# Patient Record
Sex: MALE | Race: WHITE | NOT HISPANIC OR LATINO | Employment: FULL TIME | URBAN - METROPOLITAN AREA
[De-identification: names, ages, dates, MRNs, and addresses within clinical notes are randomized per-mention and may not be internally consistent; named-entity substitution may affect disease eponyms.]

---

## 2019-08-10 ENCOUNTER — HOSPITAL ENCOUNTER (INPATIENT)
Facility: CLINIC | Age: 54
LOS: 2 days | Discharge: HOME | DRG: 247 | End: 2019-08-12
Attending: EMERGENCY MEDICINE | Admitting: INTERNAL MEDICINE
Payer: COMMERCIAL

## 2019-08-10 ENCOUNTER — APPOINTMENT (OUTPATIENT)
Dept: CT IMAGING | Facility: CLINIC | Age: 54
DRG: 247 | End: 2019-08-10
Attending: EMERGENCY MEDICINE
Payer: COMMERCIAL

## 2019-08-10 DIAGNOSIS — I21.4 NSTEMI (NON-ST ELEVATED MYOCARDIAL INFARCTION) (H): ICD-10-CM

## 2019-08-10 LAB
ALBUMIN SERPL-MCNC: 4.1 G/DL (ref 3.4–5)
ALP SERPL-CCNC: 101 U/L (ref 40–150)
ALT SERPL W P-5'-P-CCNC: 59 U/L (ref 0–70)
ANION GAP SERPL CALCULATED.3IONS-SCNC: 3 MMOL/L (ref 3–14)
AST SERPL W P-5'-P-CCNC: 29 U/L (ref 0–45)
BASOPHILS # BLD AUTO: 0 10E9/L (ref 0–0.2)
BASOPHILS NFR BLD AUTO: 0.2 %
BILIRUB DIRECT SERPL-MCNC: 0.1 MG/DL (ref 0–0.2)
BILIRUB SERPL-MCNC: 0.5 MG/DL (ref 0.2–1.3)
BUN SERPL-MCNC: 17 MG/DL (ref 7–30)
CALCIUM SERPL-MCNC: 9.1 MG/DL (ref 8.5–10.1)
CHLORIDE SERPL-SCNC: 102 MMOL/L (ref 94–109)
CO2 SERPL-SCNC: 32 MMOL/L (ref 20–32)
CREAT SERPL-MCNC: 1.07 MG/DL (ref 0.66–1.25)
D DIMER PPP FEU-MCNC: 0.7 UG/ML FEU (ref 0–0.5)
DIFFERENTIAL METHOD BLD: NORMAL
EOSINOPHIL # BLD AUTO: 0.2 10E9/L (ref 0–0.7)
EOSINOPHIL NFR BLD AUTO: 1.8 %
ERYTHROCYTE [DISTWIDTH] IN BLOOD BY AUTOMATED COUNT: 12 % (ref 10–15)
ERYTHROCYTE [DISTWIDTH] IN BLOOD BY AUTOMATED COUNT: 12 % (ref 10–15)
GFR SERPL CREATININE-BSD FRML MDRD: 79 ML/MIN/{1.73_M2}
GLUCOSE SERPL-MCNC: 101 MG/DL (ref 70–99)
HCT VFR BLD AUTO: 47 % (ref 40–53)
HCT VFR BLD AUTO: 49.3 % (ref 40–53)
HGB BLD-MCNC: 16.4 G/DL (ref 13.3–17.7)
HGB BLD-MCNC: 16.9 G/DL (ref 13.3–17.7)
IMM GRANULOCYTES # BLD: 0 10E9/L (ref 0–0.4)
IMM GRANULOCYTES NFR BLD: 0.2 %
INTERPRETATION ECG - MUSE: NORMAL
INTERPRETATION ECG - MUSE: NORMAL
LIPASE SERPL-CCNC: 409 U/L (ref 73–393)
LMWH PPP CHRO-ACNC: 0.35 IU/ML
LYMPHOCYTES # BLD AUTO: 3 10E9/L (ref 0.8–5.3)
LYMPHOCYTES NFR BLD AUTO: 33.5 %
MAGNESIUM SERPL-MCNC: 2 MG/DL (ref 1.6–2.3)
MCH RBC QN AUTO: 29.1 PG (ref 26.5–33)
MCH RBC QN AUTO: 29.7 PG (ref 26.5–33)
MCHC RBC AUTO-ENTMCNC: 34.3 G/DL (ref 31.5–36.5)
MCHC RBC AUTO-ENTMCNC: 34.9 G/DL (ref 31.5–36.5)
MCV RBC AUTO: 85 FL (ref 78–100)
MCV RBC AUTO: 85 FL (ref 78–100)
MONOCYTES # BLD AUTO: 0.8 10E9/L (ref 0–1.3)
MONOCYTES NFR BLD AUTO: 9.4 %
NEUTROPHILS # BLD AUTO: 4.9 10E9/L (ref 1.6–8.3)
NEUTROPHILS NFR BLD AUTO: 54.9 %
NRBC # BLD AUTO: 0 10*3/UL
NRBC BLD AUTO-RTO: 0 /100
PLATELET # BLD AUTO: 241 10E9/L (ref 150–450)
PLATELET # BLD AUTO: 260 10E9/L (ref 150–450)
POTASSIUM SERPL-SCNC: 3.7 MMOL/L (ref 3.4–5.3)
PROT SERPL-MCNC: 8.8 G/DL (ref 6.8–8.8)
RBC # BLD AUTO: 5.52 10E12/L (ref 4.4–5.9)
RBC # BLD AUTO: 5.81 10E12/L (ref 4.4–5.9)
SODIUM SERPL-SCNC: 137 MMOL/L (ref 133–144)
TROPONIN I SERPL-MCNC: 0.04 UG/L (ref 0–0.04)
TROPONIN I SERPL-MCNC: 0.62 UG/L (ref 0–0.04)
TROPONIN I SERPL-MCNC: 1.18 UG/L (ref 0–0.04)
TROPONIN I SERPL-MCNC: 1.5 UG/L (ref 0–0.04)
TROPONIN I SERPL-MCNC: 1.55 UG/L (ref 0–0.04)
TROPONIN I SERPL-MCNC: 1.7 UG/L (ref 0–0.04)
TROPONIN I SERPL-MCNC: 1.84 UG/L (ref 0–0.04)
WBC # BLD AUTO: 8.9 10E9/L (ref 4–11)
WBC # BLD AUTO: 9.3 10E9/L (ref 4–11)

## 2019-08-10 PROCEDURE — 84484 ASSAY OF TROPONIN QUANT: CPT | Performed by: INTERNAL MEDICINE

## 2019-08-10 PROCEDURE — 99285 EMERGENCY DEPT VISIT HI MDM: CPT | Mod: 25

## 2019-08-10 PROCEDURE — 93005 ELECTROCARDIOGRAM TRACING: CPT

## 2019-08-10 PROCEDURE — 25800030 ZZH RX IP 258 OP 636: Performed by: INTERNAL MEDICINE

## 2019-08-10 PROCEDURE — 36415 COLL VENOUS BLD VENIPUNCTURE: CPT | Performed by: INTERNAL MEDICINE

## 2019-08-10 PROCEDURE — 12000000 ZZH R&B MED SURG/OB

## 2019-08-10 PROCEDURE — 25000128 H RX IP 250 OP 636: Performed by: EMERGENCY MEDICINE

## 2019-08-10 PROCEDURE — 85520 HEPARIN ASSAY: CPT | Performed by: INTERNAL MEDICINE

## 2019-08-10 PROCEDURE — 83690 ASSAY OF LIPASE: CPT | Performed by: EMERGENCY MEDICINE

## 2019-08-10 PROCEDURE — 96365 THER/PROPH/DIAG IV INF INIT: CPT

## 2019-08-10 PROCEDURE — 99223 1ST HOSP IP/OBS HIGH 75: CPT | Mod: AI | Performed by: INTERNAL MEDICINE

## 2019-08-10 PROCEDURE — 83735 ASSAY OF MAGNESIUM: CPT | Performed by: INTERNAL MEDICINE

## 2019-08-10 PROCEDURE — 93005 ELECTROCARDIOGRAM TRACING: CPT | Mod: 76

## 2019-08-10 PROCEDURE — 71260 CT THORAX DX C+: CPT

## 2019-08-10 PROCEDURE — 80076 HEPATIC FUNCTION PANEL: CPT | Performed by: EMERGENCY MEDICINE

## 2019-08-10 PROCEDURE — 85379 FIBRIN DEGRADATION QUANT: CPT | Performed by: EMERGENCY MEDICINE

## 2019-08-10 PROCEDURE — 85027 COMPLETE CBC AUTOMATED: CPT | Performed by: INTERNAL MEDICINE

## 2019-08-10 PROCEDURE — 99222 1ST HOSP IP/OBS MODERATE 55: CPT | Performed by: INTERNAL MEDICINE

## 2019-08-10 PROCEDURE — 85025 COMPLETE CBC W/AUTO DIFF WBC: CPT | Performed by: EMERGENCY MEDICINE

## 2019-08-10 PROCEDURE — 84484 ASSAY OF TROPONIN QUANT: CPT | Performed by: EMERGENCY MEDICINE

## 2019-08-10 PROCEDURE — 80048 BASIC METABOLIC PNL TOTAL CA: CPT | Performed by: EMERGENCY MEDICINE

## 2019-08-10 PROCEDURE — 25000132 ZZH RX MED GY IP 250 OP 250 PS 637: Performed by: INTERNAL MEDICINE

## 2019-08-10 RX ORDER — POTASSIUM CL/LIDO/0.9 % NACL 10MEQ/0.1L
10 INTRAVENOUS SOLUTION, PIGGYBACK (ML) INTRAVENOUS
Status: DISCONTINUED | OUTPATIENT
Start: 2019-08-10 | End: 2019-08-12 | Stop reason: HOSPADM

## 2019-08-10 RX ORDER — NALOXONE HYDROCHLORIDE 0.4 MG/ML
.1-.4 INJECTION, SOLUTION INTRAMUSCULAR; INTRAVENOUS; SUBCUTANEOUS
Status: DISCONTINUED | OUTPATIENT
Start: 2019-08-10 | End: 2019-08-12 | Stop reason: HOSPADM

## 2019-08-10 RX ORDER — POTASSIUM CHLORIDE 1500 MG/1
20-40 TABLET, EXTENDED RELEASE ORAL
Status: DISCONTINUED | OUTPATIENT
Start: 2019-08-10 | End: 2019-08-12 | Stop reason: HOSPADM

## 2019-08-10 RX ORDER — LIDOCAINE 40 MG/G
CREAM TOPICAL
Status: DISCONTINUED | OUTPATIENT
Start: 2019-08-10 | End: 2019-08-12 | Stop reason: HOSPADM

## 2019-08-10 RX ORDER — ASPIRIN 81 MG/1
162 TABLET, CHEWABLE ORAL DAILY
Status: DISCONTINUED | OUTPATIENT
Start: 2019-08-10 | End: 2019-08-10

## 2019-08-10 RX ORDER — IOPAMIDOL 755 MG/ML
500 INJECTION, SOLUTION INTRAVASCULAR ONCE
Status: COMPLETED | OUTPATIENT
Start: 2019-08-10 | End: 2019-08-10

## 2019-08-10 RX ORDER — ATORVASTATIN CALCIUM 40 MG/1
40 TABLET, FILM COATED ORAL EVERY EVENING
Status: DISCONTINUED | OUTPATIENT
Start: 2019-08-10 | End: 2019-08-11

## 2019-08-10 RX ORDER — POTASSIUM CHLORIDE 7.45 MG/ML
10 INJECTION INTRAVENOUS
Status: DISCONTINUED | OUTPATIENT
Start: 2019-08-10 | End: 2019-08-12 | Stop reason: HOSPADM

## 2019-08-10 RX ORDER — POTASSIUM CHLORIDE 1.5 G/1.58G
20-40 POWDER, FOR SOLUTION ORAL
Status: DISCONTINUED | OUTPATIENT
Start: 2019-08-10 | End: 2019-08-12 | Stop reason: HOSPADM

## 2019-08-10 RX ORDER — MAGNESIUM SULFATE HEPTAHYDRATE 40 MG/ML
4 INJECTION, SOLUTION INTRAVENOUS EVERY 4 HOURS PRN
Status: DISCONTINUED | OUTPATIENT
Start: 2019-08-10 | End: 2019-08-12 | Stop reason: HOSPADM

## 2019-08-10 RX ORDER — ASPIRIN 81 MG/1
81 TABLET, CHEWABLE ORAL DAILY
Status: DISCONTINUED | OUTPATIENT
Start: 2019-08-10 | End: 2019-08-11

## 2019-08-10 RX ORDER — SODIUM CHLORIDE 9 MG/ML
INJECTION, SOLUTION INTRAVENOUS CONTINUOUS
Status: DISCONTINUED | OUTPATIENT
Start: 2019-08-10 | End: 2019-08-11

## 2019-08-10 RX ORDER — POTASSIUM CHLORIDE 29.8 MG/ML
20 INJECTION INTRAVENOUS
Status: DISCONTINUED | OUTPATIENT
Start: 2019-08-10 | End: 2019-08-12 | Stop reason: HOSPADM

## 2019-08-10 RX ADMIN — SODIUM CHLORIDE: 9 INJECTION, SOLUTION INTRAVENOUS at 05:32

## 2019-08-10 RX ADMIN — Medication 12.5 MG: at 08:55

## 2019-08-10 RX ADMIN — IOPAMIDOL 72 ML: 755 INJECTION, SOLUTION INTRAVENOUS at 02:03

## 2019-08-10 RX ADMIN — SODIUM CHLORIDE 85 ML: 9 INJECTION, SOLUTION INTRAVENOUS at 02:03

## 2019-08-10 RX ADMIN — ASPIRIN 81 MG 81 MG: 81 TABLET ORAL at 08:53

## 2019-08-10 RX ADMIN — ASPIRIN 81 MG 162 MG: 81 TABLET ORAL at 08:53

## 2019-08-10 RX ADMIN — SODIUM CHLORIDE: 9 INJECTION, SOLUTION INTRAVENOUS at 15:37

## 2019-08-10 RX ADMIN — Medication 4700 UNITS: at 04:39

## 2019-08-10 RX ADMIN — HEPARIN SODIUM 950 UNITS/HR: 10000 INJECTION, SOLUTION INTRAVENOUS at 04:38

## 2019-08-10 RX ADMIN — Medication 12.5 MG: at 20:09

## 2019-08-10 RX ADMIN — ATORVASTATIN CALCIUM 40 MG: 40 TABLET, FILM COATED ORAL at 20:09

## 2019-08-10 ASSESSMENT — ACTIVITIES OF DAILY LIVING (ADL)
ADLS_ACUITY_SCORE: 10

## 2019-08-10 ASSESSMENT — ENCOUNTER SYMPTOMS
DIAPHORESIS: 0
ABDOMINAL PAIN: 0
FEVER: 0
SHORTNESS OF BREATH: 0

## 2019-08-10 ASSESSMENT — MIFFLIN-ST. JEOR: SCORE: 1710.38

## 2019-08-10 NOTE — CONSULTS
"Cardiology Consultation      Alex Alvares MRN# [id number]   YOB: 1965 Age: 53 year old   Date of Admission: 8/10/2019     Reason for consult:  ACS           Assessment and Plan:     1. Abrupt onset chest discomfort with temporal correlation of troponin elevation, suspect probable ACS    Continue heparin.    Agree with echocardiogram this weekend and cardiac catheterization on Monday.    No contraindication to drug-eluting stent if needed.             Chief Complaint:   Chest Pain           History of Present Illness:   This patient is a 53 year old male who presented with abrupt chest discomfort and temporal correlation of troponin elevation.  No aortic dissection on CT PE protocol.  No previous cardiac history.  No preceding dyspnea on exertion or exertional chest pain in the days 2 weeks prior.  Spontaneous resolution of his chest discomfort after around 20 minutes.  He had taken aspirin with the onset of his chest discomfort.    He has no bleeding issues or upcoming surgeries planned.  He is currently chest pain-free.         Physical Exam:   Vitals were reviewed  Blood pressure 116/61, pulse 87, temperature 97.1  F (36.2  C), temperature source Oral, resp. rate 20, height 1.803 m (5' 11\"), weight 83.8 kg (184 lb 12.8 oz), SpO2 98 %.  Temperatures:  Current - Temp: 97.1  F (36.2  C); Max - Temp  Av.9  F (36.6  C)  Min: 97.1  F (36.2  C)  Max: 98.4  F (36.9  C)  Respiration range: Resp  Avg: 15.5  Min: 9  Max: 25  Pulse range: Pulse  Av.7  Min: 82  Max: 92  Blood pressure range: Systolic (24hrs), Av , Min:116 , Max:170   ; Diastolic (24hrs), Av, Min:61, Max:100    Pulse oximetry range: SpO2  Av.3 %  Min: 95 %  Max: 100 %  No intake or output data in the 24 hours ending 08/10/19 0823  Constitutional:   awake, alert, cooperative, no apparent distress, and appears stated age     Eyes:   Lids and lashes normal, pupils equal, round and reactive to light, extra ocular muscles " intact, sclera clear, conjunctiva normal     Neck:   supple, symmetrical, trachea midline,      Back:   symmetric     Lungs:   Clear to auscultation bilaterally     Cardiovascular:   Regular rate and rhythm, no significant murmurs     Abdomen:   benign     Musculoskeletal:   motor strength is 5 out of 5 all extremities bilaterally     Neurologic:   Grossly nonfocal     Skin:   normal skin color, texture, turgor     Additional findings:                  Past Medical History:   I have reviewed this patient's past medical history  Past Medical History:   Diagnosis Date     Bilateral high frequency sensorineural hearing loss              Past Surgical History:   I have reviewed this patient's past surgical history  Past Surgical History:   Procedure Laterality Date     MOUTH SURGERY                 Social History:   I have reviewed this patient's social history  Social History     Tobacco Use     Smoking status: Former Smoker     Types: Cigarettes     Last attempt to quit: 2007     Years since quittin.6     Smokeless tobacco: Never Used   Substance Use Topics     Alcohol use: Not on file             Family History:   I have reviewed this patient's family history  Heart disease in father          Allergies:   No Known Allergies          Medications:   I have reviewed this patient's current medications  No medications prior to admission.     Current Facility-Administered Medications Ordered in Epic   Medication Dose Route Frequency Last Rate Last Dose     aspirin (ASA) chewable tablet 162 mg  162 mg Oral Daily         aspirin (ASA) chewable tablet 81 mg  81 mg Oral Daily         atorvastatin (LIPITOR) tablet 40 mg  40 mg Oral QPM         heparin infusion 25,000 units in 0.45% NaCl 250 mL  950 Units/hr Intravenous Continuous 9.5 mL/hr at 08/10/19 0534 950 Units/hr at 08/10/19 0534     lidocaine (LMX4) cream   Topical Q1H PRN         lidocaine 1 % 0.1-1 mL  0.1-1 mL Other Q1H PRN         magnesium sulfate 4 g  in 100 mL sterile water (premade)  4 g Intravenous Q4H PRN         melatonin tablet 1 mg  1 mg Oral At Bedtime PRN         metoprolol tartrate (LOPRESSOR) half-tab 12.5 mg  12.5 mg Oral BID         naloxone (NARCAN) injection 0.1-0.4 mg  0.1-0.4 mg Intravenous Q2 Min PRN         Patient is already receiving anticoagulation with heparin, enoxaparin (LOVENOX), warfarin (COUMADIN)  or other anticoagulant medication   Does not apply Continuous PRN         potassium chloride (KLOR-CON) Packet 20-40 mEq  20-40 mEq Oral or Feeding Tube Q2H PRN         potassium chloride 10 mEq in 100 mL intermittent infusion with 10 mg lidocaine  10 mEq Intravenous Q1H PRN         potassium chloride 10 mEq in 100 mL sterile water intermittent infusion (premix)  10 mEq Intravenous Q1H PRN         potassium chloride 20 mEq in 50 mL intermittent infusion  20 mEq Intravenous Q1H PRN         potassium chloride ER (K-DUR/KLOR-CON M) CR tablet 20-40 mEq  20-40 mEq Oral Q2H PRN         sodium chloride (PF) 0.9% PF flush 3 mL  3 mL Intracatheter q1 min prn         sodium chloride (PF) 0.9% PF flush 3 mL  3 mL Intracatheter Q8H         sodium chloride 0.9% infusion   Intravenous Continuous 100 mL/hr at 08/10/19 0532       No current [facility]-ordered outpatient medications on file.             Review of Systems:   The 10 point Review of Systems is negative other than noted in the HPI            Data:   All laboratory data reviewed  Results for orders placed or performed during the hospital encounter of 08/10/19 (from the past 24 hour(s))   CBC with platelets differential   Result Value Ref Range    WBC 8.9 4.0 - 11.0 10e9/L    RBC Count 5.81 4.4 - 5.9 10e12/L    Hemoglobin 16.9 13.3 - 17.7 g/dL    Hematocrit 49.3 40.0 - 53.0 %    MCV 85 78 - 100 fl    MCH 29.1 26.5 - 33.0 pg    MCHC 34.3 31.5 - 36.5 g/dL    RDW 12.0 10.0 - 15.0 %    Platelet Count 260 150 - 450 10e9/L    Diff Method Automated Method     % Neutrophils 54.9 %    % Lymphocytes 33.5 %    %  Monocytes 9.4 %    % Eosinophils 1.8 %    % Basophils 0.2 %    % Immature Granulocytes 0.2 %    Nucleated RBCs 0 0 /100    Absolute Neutrophil 4.9 1.6 - 8.3 10e9/L    Absolute Lymphocytes 3.0 0.8 - 5.3 10e9/L    Absolute Monocytes 0.8 0.0 - 1.3 10e9/L    Absolute Eosinophils 0.2 0.0 - 0.7 10e9/L    Absolute Basophils 0.0 0.0 - 0.2 10e9/L    Abs Immature Granulocytes 0.0 0 - 0.4 10e9/L    Absolute Nucleated RBC 0.0    Basic metabolic panel   Result Value Ref Range    Sodium 137 133 - 144 mmol/L    Potassium 3.7 3.4 - 5.3 mmol/L    Chloride 102 94 - 109 mmol/L    Carbon Dioxide 32 20 - 32 mmol/L    Anion Gap 3 3 - 14 mmol/L    Glucose 101 (H) 70 - 99 mg/dL    Urea Nitrogen 17 7 - 30 mg/dL    Creatinine 1.07 0.66 - 1.25 mg/dL    GFR Estimate 79 >60 mL/min/[1.73_m2]    GFR Estimate If Black >90 >60 mL/min/[1.73_m2]    Calcium 9.1 8.5 - 10.1 mg/dL   Troponin I   Result Value Ref Range    Troponin I ES 0.035 0.000 - 0.045 ug/L   Hepatic panel   Result Value Ref Range    Bilirubin Direct 0.1 0.0 - 0.2 mg/dL    Bilirubin Total 0.5 0.2 - 1.3 mg/dL    Albumin 4.1 3.4 - 5.0 g/dL    Protein Total 8.8 6.8 - 8.8 g/dL    Alkaline Phosphatase 101 40 - 150 U/L    ALT 59 0 - 70 U/L    AST 29 0 - 45 U/L   D dimer quantitative   Result Value Ref Range    D Dimer 0.7 (H) 0.0 - 0.50 ug/ml FEU   Lipase   Result Value Ref Range    Lipase 409 (H) 73 - 393 U/L   CT Chest Pulmonary Embolism w Contrast    Narrative    EXAM: CT CHEST PULMONARY EMBOLISM W CONTRAST  LOCATION: [facility]  DATE/TIME: 8/10/2019 2:00 AM    INDICATION: Chest pain.  COMPARISON: None available.  TECHNIQUE: Helical acquisition through the chest was performed during the arterial phase of contrast enhancement using IV contrast. 2D and 3D reconstructions were performed by the CT technologist. Dose reduction techniques were used.   IV CONTRAST: 85mL Isovue-370    FINDINGS:  ANGIOGRAM CHEST: Pulmonary arteries are normal caliber and negative for pulmonary  emboli. Normal caliber thoracic aorta with no dissection or aneurysm.    RV/LV RATIO: N/A    LUNGS AND PLEURA: Minimal subsegmental dependent atelectasis. The lungs are otherwise clear. No pneumothorax or pleural effusion.    MEDIASTINUM: Heart size is normal. Small calcifications of the left anterior descending coronary artery. No pericardial effusion. No mediastinal adenopathy.    LIMITED UPPER ABDOMEN: There is narrowing of the origin of the celiac artery which may be caused by extrinsic compression from the median arcuate ligament of the diaphragm. No focal inflammatory change.    MUSCULOSKELETAL: Negative.      Impression    CONCLUSION:  1.  Negative for pulmonary embolism or thoracic aortic aneurysm or dissection.    2.  Mild coronary artery disease.    3.  No evidence of pneumonia or pulmonary edema.   Troponin I (now)   Result Value Ref Range    Troponin I ES 0.624 (HH) 0.000 - 0.045 ug/L   Troponin I   Result Value Ref Range    Troponin I ES 1.502 (HH) 0.000 - 0.045 ug/L   CBC with platelets   Result Value Ref Range    WBC 9.3 4.0 - 11.0 10e9/L    RBC Count 5.52 4.4 - 5.9 10e12/L    Hemoglobin 16.4 13.3 - 17.7 g/dL    Hematocrit 47.0 40.0 - 53.0 %    MCV 85 78 - 100 fl    MCH 29.7 26.5 - 33.0 pg    MCHC 34.9 31.5 - 36.5 g/dL    RDW 12.0 10.0 - 15.0 %    Platelet Count 241 150 - 450 10e9/L   Magnesium   Result Value Ref Range    Magnesium 2.0 1.6 - 2.3 mg/dL       No results found for: CHOL  No results found for: HDL  No results found for: LDL  No results found for: TRIG  No results found for: CHOLHDLRATIO  No results found for: TSH

## 2019-08-10 NOTE — H&P
[facility] History and Physical    Alex Alvares MRN# [id number]   Age: 53 year old YOB: 1965     Date of Admission:  8/10/2019    Home clinic: Not established          Assessment and Plan:   Assessment:   Alex Alvares is a 53-year-old man who was brought to the emergency department by his wife after he had abrupt onset of severe chest pain that also radiated to his left shoulder and down his left arm.  He was evaluated in the emergency department and the initial troponin was in normal range.  The follow-up value was 0.6 for which reason he is not being admitted as a NSTEMI.    Medical risks include remote history of smoking and history of heart disease in his father and grandfather.  He does not follow regularly with medical care and is not aware of his blood pressure or cholesterol values.  He does not believe he is diabetic.    Diagnoses:  1.  NSTEMI.  Unfortunately, the patient's story is consistent with acute coronary syndrome and his labs are compatible.    2.  Inadequate baseline medical care.      Plan:   1.  Admit to inpatient on telemetry.  2.  Echocardiogram in the morning.  3.  Cardiology consultation.  4.  Continue heparin drip, daily aspirin, initiate beta-blocker at low dose, atorvastatin 40 mg daily.  5.  Check lipid profile.             Chief Complaint:   Chest pain     History is obtained from the patient, emergency room physician and electronic medical record.    Alex Alvares indicates that he has been under a lot of stress recently.  He lost his job a couple of days ago and apparently was told that it was possibly related to some type of sexual harassment that he had committed.  He was not aware of anything but this of course added to his distress.    On the evening when he came to attention, 8/9/2019, patient apparently was at home and doing some chores around the house.  He had been doing some laundry and cleaning the cat litter boxes in the basement when he  developed abrupt onset very intense chest pain.  He described to me with the same words that he described by Dr. Rod that he felt tearing central chest pain.  It ultimately moved to his left shoulder and when it started to go down his left arm he became concerned.    His wife indicates that when she saw him he was leaning over the counter.  The patient indicates that he was trying to stretch out his chest discomfort at that time, as though it was muscular pain.  She does not believe that he was significantly pale or diaphoretic.  He was not particularly short of breath and denies nausea and vomiting with that.  It is not clear that he had significant worsening of his discomfort when he walks upstairs.  Total duration of the pain about 20 minutes.    He took 2 baby aspirins and came to the emergency department.          Past Medical History:   No prior history identified.  The patient has not seen a doctor in many years.         Past Surgical History:   None         Social History:     Social History     Tobacco Use     Smoking status: Former Smoker     Types: Cigarettes     Last attempt to quit: 2007     Years since quittin.6     Smokeless tobacco: Never Used   Substance Use Topics     Alcohol use: Not on file            Family History:   Patient reports that his father was morbidly obese and suffers from diabetes as well as heart disease.  Grandfather also had heart disease.         Allergies:   No Known Allergies          Medications:     No medications prior to admission.             Review of Systems:   A comprehensive review of systems was performed and found to be negative except as described in this note           Physical Exam:   Vitals were reviewed  Temp: 97.1  F (36.2  C) Temp src: Oral BP: 116/61 Pulse: 87 Heart Rate: 84 Resp: 20 SpO2: 98 % O2 Device: None (Room air)    Constitutional: Awake, alert, cooperative, no apparent distress, and appears stated age.  Eyes: Lids and lashes normal,  pupils equal, round and reactive to light, extra ocular muscles intact, sclera clear, conjunctiva normal.  ENT: Normocephalic, without obvious abnormality, atraumatic, sinuses nontender on palpation, external ears without lesions, oral pharynx with moist mucus membranes, gums normal and good dentition.  Neck: Supple, symmetrical, trachea midline, no adenopathy, thyroid symmetric, not enlarged and no tenderness, skin normal.  Hematologic / Lymphatic: No cervical lymphadenopathy and no supraclavicular lymphadenopathy.  Back: Symmetric, no curvature, spinous processes are non-tender on palpation, paraspinous muscles are non-tender on palpation, no costal vertebral tenderness.  Lungs: No increased work of breathing, good air exchange, clear to auscultation bilaterally, no crackles or wheezing.  Cardiovascular: Regular rate and rhythm, normal S1 and S2, no S3 or S4, and no murmur noted.  Abdomen: No scars, normal bowel sounds, soft, non-distended, non-tender, no masses palpated, no hepatosplenomegaly.  Musculoskeletal: No redness, warmth, or swelling of the joints.  No edema.  Neurologic: Awake, alert, oriented to name, place and time.  Cranial nerves II-XII are grossly intact.  Neuropsychiatric: Normal affect, mood, orientation, memory and insight.  Skin: No rashes, erythema, pallor, petechia or purpura.          Data:     Results for orders placed or performed during the hospital encounter of 08/10/19 (from the past 24 hour(s))   CBC with platelets differential   Result Value Ref Range    WBC 8.9 4.0 - 11.0 10e9/L    RBC Count 5.81 4.4 - 5.9 10e12/L    Hemoglobin 16.9 13.3 - 17.7 g/dL    Hematocrit 49.3 40.0 - 53.0 %    MCV 85 78 - 100 fl    MCH 29.1 26.5 - 33.0 pg    MCHC 34.3 31.5 - 36.5 g/dL    RDW 12.0 10.0 - 15.0 %    Platelet Count 260 150 - 450 10e9/L    Diff Method Automated Method     % Neutrophils 54.9 %    % Lymphocytes 33.5 %    % Monocytes 9.4 %    % Eosinophils 1.8 %    % Basophils 0.2 %    % Immature  Granulocytes 0.2 %    Nucleated RBCs 0 0 /100    Absolute Neutrophil 4.9 1.6 - 8.3 10e9/L    Absolute Lymphocytes 3.0 0.8 - 5.3 10e9/L    Absolute Monocytes 0.8 0.0 - 1.3 10e9/L    Absolute Eosinophils 0.2 0.0 - 0.7 10e9/L    Absolute Basophils 0.0 0.0 - 0.2 10e9/L    Abs Immature Granulocytes 0.0 0 - 0.4 10e9/L    Absolute Nucleated RBC 0.0    Basic metabolic panel   Result Value Ref Range    Sodium 137 133 - 144 mmol/L    Potassium 3.7 3.4 - 5.3 mmol/L    Chloride 102 94 - 109 mmol/L    Carbon Dioxide 32 20 - 32 mmol/L    Anion Gap 3 3 - 14 mmol/L    Glucose 101 (H) 70 - 99 mg/dL    Urea Nitrogen 17 7 - 30 mg/dL    Creatinine 1.07 0.66 - 1.25 mg/dL    GFR Estimate 79 >60 mL/min/[1.73_m2]    GFR Estimate If Black >90 >60 mL/min/[1.73_m2]    Calcium 9.1 8.5 - 10.1 mg/dL   Troponin I   Result Value Ref Range    Troponin I ES 0.035 0.000 - 0.045 ug/L   Hepatic panel   Result Value Ref Range    Bilirubin Direct 0.1 0.0 - 0.2 mg/dL    Bilirubin Total 0.5 0.2 - 1.3 mg/dL    Albumin 4.1 3.4 - 5.0 g/dL    Protein Total 8.8 6.8 - 8.8 g/dL    Alkaline Phosphatase 101 40 - 150 U/L    ALT 59 0 - 70 U/L    AST 29 0 - 45 U/L   D dimer quantitative   Result Value Ref Range    D Dimer 0.7 (H) 0.0 - 0.50 ug/ml FEU   Lipase   Result Value Ref Range    Lipase 409 (H) 73 - 393 U/L   CT Chest Pulmonary Embolism w Contrast    Narrative    EXAM: CT CHEST PULMONARY EMBOLISM W CONTRAST  LOCATION: [facility]  DATE/TIME: 8/10/2019 2:00 AM    INDICATION: Chest pain.  COMPARISON: None available.  TECHNIQUE: Helical acquisition through the chest was performed during the arterial phase of contrast enhancement using IV contrast. 2D and 3D reconstructions were performed by the CT technologist. Dose reduction techniques were used.   IV CONTRAST: 85mL Isovue-370    FINDINGS:  ANGIOGRAM CHEST: Pulmonary arteries are normal caliber and negative for pulmonary emboli. Normal caliber thoracic aorta with no dissection or aneurysm.    RV/LV  RATIO: N/A    LUNGS AND PLEURA: Minimal subsegmental dependent atelectasis. The lungs are otherwise clear. No pneumothorax or pleural effusion.    MEDIASTINUM: Heart size is normal. Small calcifications of the left anterior descending coronary artery. No pericardial effusion. No mediastinal adenopathy.    LIMITED UPPER ABDOMEN: There is narrowing of the origin of the celiac artery which may be caused by extrinsic compression from the median arcuate ligament of the diaphragm. No focal inflammatory change.    MUSCULOSKELETAL: Negative.      Impression    CONCLUSION:  1.  Negative for pulmonary embolism or thoracic aortic aneurysm or dissection.    2.  Mild coronary artery disease.    3.  No evidence of pneumonia or pulmonary edema.   Troponin I (now)   Result Value Ref Range    Troponin I ES 0.624 (HH) 0.000 - 0.045 ug/L   Troponin I   Result Value Ref Range    Troponin I ES 1.502 (HH) 0.000 - 0.045 ug/L   CBC with platelets   Result Value Ref Range    WBC 9.3 4.0 - 11.0 10e9/L    RBC Count 5.52 4.4 - 5.9 10e12/L    Hemoglobin 16.4 13.3 - 17.7 g/dL    Hematocrit 47.0 40.0 - 53.0 %    MCV 85 78 - 100 fl    MCH 29.7 26.5 - 33.0 pg    MCHC 34.9 31.5 - 36.5 g/dL    RDW 12.0 10.0 - 15.0 %    Platelet Count 241 150 - 450 10e9/L   Magnesium   Result Value Ref Range    Magnesium 2.0 1.6 - 2.3 mg/dL      EKG results:   Performed on admission        Normal sinus rhythm, normal axis.  Nonspecific ST abns noted in V3-V5.       All imaging studies reviewed by me.      Attestation:  I have reviewed today's vital signs, notes, medications, labs and imaging.  Total time: 25 minutes     Joel Cotton MD

## 2019-08-10 NOTE — ED TRIAGE NOTES
Chest pain that radiates down Lt arm, started 30 min ago. Pt was bending over to move laundry when pain started.

## 2019-08-10 NOTE — Clinical Note
Max pressure = 8 nayely. Total duration = 20 seconds.     Max pressure = 8 nayely. Total duration = 20 seconds.    Balloon reinflated a second time: Max pressure = 8 nayely. Total duration = 20 seconds.

## 2019-08-10 NOTE — PHARMACY-ADMISSION MEDICATION HISTORY
Admission medication history interview status for this patient is complete. See [facility] admission navigator for allergy information, prior to admission medications and immunization status.     Medication history interview source(s):Patient  Medication history resources (including written lists, pill bottles, clinic record):None  Primary pharmacy: [facility]    Changes made to PTA medication list:  Added: none  Deleted: none  Changed: none    Actions taken by pharmacist (provider contacted, etc):None     Additional medication history information:None    Medication reconciliation/reorder completed by provider prior to medication history? Yes    Do you take OTC medications (eg tylenol, ibuprofen, fish oil, eye/ear drops, etc)? Y (Y/N)    For patients on insulin therapy: N (Y/N)      Prior to Admission medications    Not on File

## 2019-08-10 NOTE — PROGRESS NOTES
Pt  admitted to floor. Denies chest pain. VSS. Heparin gtt and IVF infusing. Tele SR. Wife at bedside. Plan for cardiology consult.  Norma Mena RN on 8/10/2019 at 6:04 AM

## 2019-08-10 NOTE — PLAN OF CARE
RN SHIFT SUMMARY :  DX/STORY : admit  from home early 8/10 with CP radiating down left arm. 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 Had Neg. Cult 3/12/12- states hes had 3 neg   LABS/PROTOCOLS : K & MG protocols + RX monitoring IV Heparin - Hep At 950 units  PROCEDURES: Chest CT Neg., ECHO pending  TELE : SR   ASSESS : a/o, up in room with SBA. No CP since arrival to floor.    TEACHING : Gave  info on angio & all his current Meds   Ordered Diet consult for Heart Healthy   PLAN : at baseline is  , works as , & from [place]. Cont POC of Heparin Gtt,  Cardiology consult pending . Poss. Angio. Monday .

## 2019-08-10 NOTE — PLAN OF CARE
RN SHIFT SUMMARY :  DX/STORY : admit  from home early 8/10 with CP radiating down left arm. 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 Had Neg. Cult 3/12/12- states hes had 3 neg   LABS/PROTOCOLS : K & MG protocols + RX monitoring IV Heparin - Hep At 950 units  PROCEDURES: Chest CT Neg., ECHO pending  TELE : SR   ASSESS : a/o, up in room with SBA. No CP since arrival to floor.    TEACHING : Gave  info on angio & all his current Meds   Ordered Diet consult for Heart Healthy diet  PLAN : at baseline is  , works as , & from [place]. Cont POC of Heparin Gtt,  Cardiology consult pending . . Angio. Monday .

## 2019-08-10 NOTE — CONSULTS
"CLINICAL NUTRITION SERVICES  -  BRIEF EDUCATION ASSESSMENT NOTE      Malnutrition: Patient does not meet malnutrition criteria at this time        REASON FOR ASSESSMENT  Alex Alvares is a 53 year old male seen by Registered Dietitian for Provider Order - Nutrition Education - heart healthy diet.        NUTRITION HISTORY  - Information obtained from patient and wife in room, chart.  - Admitted 2/2 chest pain, found to NSTEMI, plans for angio on Monday.  - Patient works as a .  He has a good knowledge-base of fiber-containing foods, plant-based proteins, saturated fat content of foods, etc.  - He has been following a low carb diet at home more recently.  To him this includes an increase in vegetables, plant-based proteins, minimal grains, and he also does not often consume dairy products.  - NKFA.      CURRENT NUTRITION ORDERS  Diet Order:     Cardiac    Current Intake/Tolerance:  Good appetite since admit.      NUTRITION FOCUSED PHYSICAL ASSESSMENT FOR DIAGNOSING MALNUTRITION)  Completed:  Yes Full assessment         Observed:    No fat or muscle loss observed    Obtained from Chart/Interdisciplinary Team:  No pertinent other than noted    ANTHROPOMETRICS  Height: 5' 11\"  Weight: 83.8 kg (184#)  Body mass index is 25.77 kg/m .  Weight Status:  Overweight BMI 25-29.9  IBW: 78.2 kg (172#)  % IBW: 107%  Weight History:  Wt Readings from Last 10 Encounters:   08/10/19 83.8 kg (184 lb 12.8 oz)   05/17/16 88.9 kg (196 lb)   04/08/13 87 kg (191 lb 11.2 oz)   11/30/08 83.9 kg (185 lb)   08/21/08 78.9 kg (174 lb)   08/20/08 79.4 kg (175 lb)   12/11/07 77.6 kg (171 lb)   07/29/07 70.3 kg (155 lb)   - Patient denies wt loss PTA.    LABS  Labs reviewed    MEDICATIONS  Medications reviewed      MALNUTRITION:  % Weight Loss:  None noted  % Intake:  No decreased intake noted  Subcutaneous Fat Loss:  None observed  Muscle Loss:  None observed  Fluid Retention:  None noted    Malnutrition Diagnosis: Patient does not meet two of " "the above criteria necessary for diagnosing malnutrition    NUTRITION DIAGNOSIS:  Food and nutrition-related knowledge deficit related to lack of exposure to MNT PTA as evidenced by new NSTEMI.      NUTRITION INTERVENTIONS  Recommendations / Nutrition Prescription  Continue cardiac diet at discharge.      Implementation  Nutrition education: Provided education on cardiac diet:    Assessed learning needs, learning preferences, and willingness to learn    Nutrition Education (Content):  a) Provided handout \"heart healthy nutrition therapy\"  b) Discussed recommendations for increasing fiber intake via fruits, vegetables, and whole grains  c) Discussed common foods which contain saturated fat, appropriate substitutes  d) Discussed common foods which contain trans fat, appropriate substitutes  e) Discussed common foods which are high in sodium, appropriate substitutes  f) Discussed how to read nutrition facts label    Nutrition Education (Application):  a) Discussed eating habits and recommended alternative food choices    Patient verbalizes understanding of diet by stating desire to monitor saturated fat content of diet.    Anticipate good compliance    Diet Education - refer to Education Flowsheet    Collaboration and Referral of Nutrition care: Discussed POC with team during rounds.      Nutrition Goals  Patient to verbalize 2 foods that contain saturated fat.      MONITORING AND EVALUATION:  Progress towards goals will be monitored and evaluated per protocol and Practice Guidelines            Mariel Hair RD, LD  Clinical Dietitian  3rd floor/ICU: [phone]  All other floors: [phone]  Weekend/holiday: [phone]  "

## 2019-08-10 NOTE — LETTER
[place] [address] [facility]  [address] [facility] [address]  [phone]    2019    Re: Alex Alvares  [address] [facility] [address]  [phone] (home)     : 1965      To Whom It May Concern:      Alex Alvares was hospitalized from 8/10/2019 until 2019 due to medical illness.  He may return to work on 19 with full duty.        Sincerely,        Baljeet Mauro MD  IM/Hospitalist

## 2019-08-10 NOTE — ED PROVIDER NOTES
"  History     Chief Complaint:  Chest Pain    HPI   Alex Alvares is a 53 year old male who presents to the emergency department today for evaluation of chest pain. The patient reports that he was doing laundry and cleaning the litter box and suddenly experienced \"massive\" chest pain where it felt like his \"muscles were ripping off\". The pain started about an hour ago at most, and lasted for about 20 minutes. This radiated to his left arm.  He notes that the pain is not reproducible at this moment here in the ED. The patient took 2 baby aspirins to try and relieve the pain. He denies diaphoresis, shortness of breath, abdominal pain, fever, or any recent travel. Notably, the patient notes that he admittedly had severe stress the last couple days to the point where he didn't sleep tonight.     Allergies:  No Known Drug Allergies      Medications:    Aspirin     Past Medical History:    Past medical history reviewed. No pertinent medical history.     Past Surgical History:    Mouth surgery     Family History:    Family history reviewed. No pertinent family history.     Social History:  The patient was accompanied to the ED by wife.  Smoking Status: Former Smoker   YEARS SINCE: 12.6  Smokeless Tobacco: Never Used  Marital Status:          Review of Systems   Constitutional: Negative for diaphoresis and fever.   Respiratory: Negative for shortness of breath.    Cardiovascular: Positive for chest pain. Negative for leg swelling.   Gastrointestinal: Negative for abdominal pain.   All other systems reviewed and are negative.        Physical Exam     Patient Vitals for the past 24 hrs:   BP Temp Temp src Heart Rate Resp SpO2 Weight   08/10/19 0345 -- -- -- 87 10 97 % --   08/10/19 0343 -- -- -- -- -- -- 84.4 kg (186 lb 1.1 oz)   08/10/19 0330 -- -- -- 85 12 97 % --   08/10/19 0315 -- -- -- 86 11 96 % --   08/10/19 0300 -- -- -- 92 15 97 % --   08/10/19 0245 -- -- -- 89 16 95 % --   08/10/19 0230 -- -- -- 88 15 97 % " --   08/10/19 0145 132/81 -- -- 92 18 -- --   08/10/19 0130 129/78 -- -- 80 16 -- --   08/10/19 0115 138/82 -- -- 86 9 -- --   08/10/19 0100 134/79 -- -- 85 25 -- --   08/10/19 0045 145/89 -- -- 84 14 -- --   08/10/19 0030 146/95 -- -- 92 -- 98 % --   08/10/19 0027 170/100 98.4  F (36.9  C) Oral 92 20 100 % --        Physical Exam  Nursing note and vitals reviewed.  Constitutional: Cooperative.   HENT:   Mouth/Throat: Mucous membranes are normal.   Cardiovascular: Normal rate, regular rhythm and normal heart sounds.  No murmur.  Pulmonary/Chest: Effort normal and breath sounds normal. No respiratory distress. No wheezes. No rales.   Abdominal: Soft. Normal appearance and bowel sounds are normal. No distension. There is no tenderness. There is no rigidity and no guarding.   Musculoskeletal: No edema to the legs.   Neurological: Alert. Oriented x4  Skin: Skin is warm and dry. No rash noted.   Psychiatric: Normal mood and affect.     Emergency Department Course     ECG:  ECG taken at 0030, ECG read at 0031  Normal sinus rhythm  Nonspecific ST abnormality - V3-V5 with ST depression  Rate 94 bpm. CA interval 128 ms. QRS duration 86 ms. QT/QTc 336/420 ms. P-R-T axes 58 61 41.    ECG:  ECG taken at 0355, ECG read at 0400  Normal sinus rhythm  Nonspecific ST abnormality - ST depression laterally improved compared to previous  Rate 86 bpm. CA interval 130 ms. QRS duration 86 ms. QT/QTc 348/416 ms. P-R-T axes 58 62 19.     Imaging:  Radiology findings were communicated with the patient who voiced understanding of the findings.    CT Chest Pulmonary Embolism w Contrast  1.  Negative for pulmonary embolism or thoracic aortic aneurysm or dissection.  2.  Mild coronary artery disease.  3.  No evidence of pneumonia or pulmonary edema.  Reading per radiology    Laboratory:  Laboratory findings were communicated with the patient who voiced understanding of the findings.      CBC: WBC 8.9, HGB 16.9,   BMP: Glucose 101 (H) o/w  WNL (Creatinine 1.07)  Hepatic panel: WNL  Lipase: 409 (H)    Troponin (Collected 0029): 0.035   Troponin (Collected 0259): 0.624     D dimer: 0.7 (H)    Interventions:  Aspirin 162mg PO  Heparin bolus and drip per CV protocol     Emergency Department Course:    0026 Nursing notes and vitals reviewed.    0029 IV was inserted and blood was drawn for laboratory testing, results above.     0030 I performed an exam of the patient as documented above.     0031 EKG obtained as noted above.     0140 Patient rechecked and updated.      0200 The patient was sent for a CT chest pulmonary embolism while in the emergency department, results above.      0259 Blood was drawn for laboratory testing, results above.     0342 Patient rechecked and updated.      0355 EKG obtained as noted above.     0435 I spoke with Dr. Cotton of the Hospitalist service from [facility] regarding patient's presentation, findings, and plan of care.     Impression & Plan      Medical Decision Making:  Alex Alvares is a 53 year old male who presents to the emergency department today for evaluation of chest pain as described in the HPI. Initial EKG showed ST depression in his lateral precordial leads which has now normalized. He has been pain free since arrival. Unfortunately his delta troponin showed significant injury consistent with non-ST elevation Myocardial infarction. D-Dimer results was elevated which prompted CT imaging, fortunately no pulmonary embolism, aortic dissection, pneumonia, pneumothorax, or other thoracic abnormality was identified. He has received a full aspirin equivalent given his 162mg taken at home and is started on heparin for medical management for his MI.     Diagnosis:    ICD-10-CM    1. NSTEMI (non-ST elevated myocardial infarction) (H) I21.4      Disposition:   The patient is admitted into the care of Dr. Cotton.     Scribe Disclosure:  Daniele CHEW, am serving as a scribe at 12:35 AM on 8/10/2019 to document  services personally performed by Chadwick Rod MD based on my observations and the provider's statements to me.     [facility] EMERGENCY DEPARTMENT       Chadwick Rod MD  08/10/19 0637

## 2019-08-10 NOTE — ED NOTES
[facility]  ED Nurse Handoff Report    Alex Alvares is a 53 year old male   ED Chief complaint: Chest Pain  . ED Diagnosis:   Final diagnoses:   NSTEMI (non-ST elevated myocardial infarction) (H)     Allergies: No Known Allergies    Code Status: Full Code  Activity level - Baseline/Home:  Independent. Activity Level - Current:   Independent. Lift room needed: No. Bariatric: No   Needed: No   Isolation: No. Infection: Not Applicable.     Vital Signs:   Vitals:    08/10/19 0230 08/10/19 0245 08/10/19 0300 08/10/19 0343   BP:       Pulse: 82 90 89    Resp: 15 16 15    Temp:       TempSrc:       SpO2: 97% 95% 97%    Weight:    84.4 kg (186 lb 1.1 oz)       Cardiac Rhythm:  ,   Cardiac  Cardiac Rhythm: Normal sinus rhythm  Pain level:    Patient confused: No. Patient Falls Risk: No.   Elimination Status: Has voided   Patient Report - Initial Complaint: Chest pain that radiates down Lt arm, started 30 min ago. Pt was bending over to move laundry when pain started.. Focused Assessment: Cardiac - Cardiac WDL: -WDL except   Chest Pain Assessment - Chest Pain Location: midsternal  Chest Pain Radiation: arm  Precipitating Factors: activity  Chest Pain Reproducible?: No   Cardiac Monitoring - EKG Monitoring: Yes  Cardiac Regularity: Regular  Cardiac Rhythm: NSR    Tests Performed: Lab, CT, UA, EKG. Abnormal Results:   Labs Ordered and Resulted from Time of ED Arrival Up to the Time of Departure from the ED   BASIC METABOLIC PANEL - Abnormal; Notable for the following components:       Result Value    Glucose 101 (*)     All other components within normal limits   D DIMER QUANTITATIVE - Abnormal; Notable for the following components:    D Dimer 0.7 (*)     All other components within normal limits   LIPASE - Abnormal; Notable for the following components:    Lipase 409 (*)     All other components within normal limits   TROPONIN I - Abnormal; Notable for the following components:    Troponin I ES 0.624  (*)     All other components within normal limits   CBC WITH PLATELETS DIFFERENTIAL   TROPONIN I   HEPATIC PANEL   PLATELETS MONITORED PER HEPARIN TREATMENT PROTOCOL (FOR MEANINGFUL USE   PERIPHERAL IV CATHETER   CARDIAC CONTINUOUS MONITORING   PULSE OXIMETRY NURSING   PERIPHERAL IV CATHETER   PATIENT CARE ORDER   MEASURE WEIGHT   NOTIFY PHYSICIAN   NOTIFY PHYSICIAN     CT Chest Pulmonary Embolism w Contrast   Final Result   CONCLUSION:   1.  Negative for pulmonary embolism or thoracic aortic aneurysm or dissection.      2.  Mild coronary artery disease.      3.  No evidence of pneumonia or pulmonary edema.      .   Treatments provided: See MAR  Family Comments: wife present  OBS brochure/video discussed/provided to patient:  N/A  ED Medications:   Medications   0.9% sodium chloride BOLUS (0 mLs Intravenous Stopped 8/10/19 0206)   iopamidol (ISOVUE-370) solution 500 mL (72 mLs Intravenous Given 8/10/19 0203)     Drips infusing:  Yes  For the majority of the shift, the patient's behavior Green. Interventions performed were none.     Severe Sepsis OR Septic Shock Diagnosis Present: No      ED Nurse Name/Phone Number: Lm NAIDUMarisela John,   3:48 AM  RECEIVING UNIT ED HANDOFF REVIEW    Above ED Nurse Handoff Report was reviewed: Yes  Reviewed by: Norma Mena on August 10, 2019 at 5:08 AM

## 2019-08-10 NOTE — Clinical Note
Max pressure = 6 nayely. Total duration = 20 seconds.     Max pressure = 6 nayely. Total duration = 20 seconds.    Balloon reinflated a second time: Max pressure = 6 nayely. Total duration = 20 seconds.  Balloon reinflated a third time: Max pressure = 6 nayely. Total duration = 15 seconds.  Balloon reinflated a fourth time:

## 2019-08-10 NOTE — Clinical Note
R-Band utilized for closure of sight.  Manual pressure applied by scrub person; hemostasis achieved.   yes

## 2019-08-11 ENCOUNTER — APPOINTMENT (OUTPATIENT)
Dept: CARDIOLOGY | Facility: CLINIC | Age: 54
DRG: 247 | End: 2019-08-11
Attending: INTERNAL MEDICINE
Payer: COMMERCIAL

## 2019-08-11 LAB
ANION GAP SERPL CALCULATED.3IONS-SCNC: 3 MMOL/L (ref 3–14)
BUN SERPL-MCNC: 17 MG/DL (ref 7–30)
CALCIUM SERPL-MCNC: 8.2 MG/DL (ref 8.5–10.1)
CHLORIDE SERPL-SCNC: 110 MMOL/L (ref 94–109)
CHOLEST SERPL-MCNC: 199 MG/DL
CO2 SERPL-SCNC: 27 MMOL/L (ref 20–32)
CREAT SERPL-MCNC: 0.82 MG/DL (ref 0.66–1.25)
GFR SERPL CREATININE-BSD FRML MDRD: >90 ML/MIN/{1.73_M2}
GLUCOSE SERPL-MCNC: 122 MG/DL (ref 70–99)
HBA1C MFR BLD: 5.6 % (ref 0–5.6)
HDLC SERPL-MCNC: 36 MG/DL
LDLC SERPL CALC-MCNC: 129 MG/DL
LMWH PPP CHRO-ACNC: 0.25 IU/ML
NONHDLC SERPL-MCNC: 163 MG/DL
POTASSIUM SERPL-SCNC: 4 MMOL/L (ref 3.4–5.3)
SODIUM SERPL-SCNC: 140 MMOL/L (ref 133–144)
TRIGL SERPL-MCNC: 168 MG/DL
TROPONIN I SERPL-MCNC: 1.12 UG/L (ref 0–0.04)
TROPONIN I SERPL-MCNC: 1.19 UG/L (ref 0–0.04)

## 2019-08-11 PROCEDURE — 99232 SBSQ HOSP IP/OBS MODERATE 35: CPT | Performed by: INTERNAL MEDICINE

## 2019-08-11 PROCEDURE — 99232 SBSQ HOSP IP/OBS MODERATE 35: CPT | Mod: 25 | Performed by: INTERNAL MEDICINE

## 2019-08-11 PROCEDURE — 25000132 ZZH RX MED GY IP 250 OP 250 PS 637: Performed by: INTERNAL MEDICINE

## 2019-08-11 PROCEDURE — 36415 COLL VENOUS BLD VENIPUNCTURE: CPT | Performed by: INTERNAL MEDICINE

## 2019-08-11 PROCEDURE — 80061 LIPID PANEL: CPT | Performed by: INTERNAL MEDICINE

## 2019-08-11 PROCEDURE — 12000000 ZZH R&B MED SURG/OB

## 2019-08-11 PROCEDURE — 25800030 ZZH RX IP 258 OP 636: Performed by: INTERNAL MEDICINE

## 2019-08-11 PROCEDURE — 80048 BASIC METABOLIC PNL TOTAL CA: CPT | Performed by: INTERNAL MEDICINE

## 2019-08-11 PROCEDURE — 93306 TTE W/DOPPLER COMPLETE: CPT | Mod: 26 | Performed by: INTERNAL MEDICINE

## 2019-08-11 PROCEDURE — 85520 HEPARIN ASSAY: CPT | Performed by: INTERNAL MEDICINE

## 2019-08-11 PROCEDURE — 84484 ASSAY OF TROPONIN QUANT: CPT | Performed by: INTERNAL MEDICINE

## 2019-08-11 PROCEDURE — 25000128 H RX IP 250 OP 636: Performed by: INTERNAL MEDICINE

## 2019-08-11 PROCEDURE — 25500064 ZZH RX 255 OP 636: Performed by: INTERNAL MEDICINE

## 2019-08-11 PROCEDURE — 40000264 ECHOCARDIOGRAM COMPLETE

## 2019-08-11 PROCEDURE — 83036 HEMOGLOBIN GLYCOSYLATED A1C: CPT | Performed by: INTERNAL MEDICINE

## 2019-08-11 RX ORDER — CARVEDILOL 3.12 MG/1
3.12 TABLET ORAL 2 TIMES DAILY WITH MEALS
Status: DISCONTINUED | OUTPATIENT
Start: 2019-08-11 | End: 2019-08-12 | Stop reason: HOSPADM

## 2019-08-11 RX ORDER — ASPIRIN 81 MG/1
81 TABLET, CHEWABLE ORAL DAILY
Status: DISCONTINUED | OUTPATIENT
Start: 2019-08-13 | End: 2019-08-12

## 2019-08-11 RX ORDER — SODIUM CHLORIDE 9 MG/ML
INJECTION, SOLUTION INTRAVENOUS CONTINUOUS PRN
Status: DISCONTINUED | OUTPATIENT
Start: 2019-08-11 | End: 2019-08-12 | Stop reason: HOSPADM

## 2019-08-11 RX ORDER — ATORVASTATIN CALCIUM 40 MG/1
80 TABLET, FILM COATED ORAL EVERY EVENING
Status: DISCONTINUED | OUTPATIENT
Start: 2019-08-11 | End: 2019-08-12 | Stop reason: HOSPADM

## 2019-08-11 RX ADMIN — SODIUM CHLORIDE: 9 INJECTION, SOLUTION INTRAVENOUS at 01:14

## 2019-08-11 RX ADMIN — HEPARIN SODIUM 950 UNITS/HR: 10000 INJECTION, SOLUTION INTRAVENOUS at 01:15

## 2019-08-11 RX ADMIN — Medication 12.5 MG: at 21:20

## 2019-08-11 RX ADMIN — Medication 12.5 MG: at 08:01

## 2019-08-11 RX ADMIN — CARVEDILOL 3.12 MG: 3.12 TABLET, FILM COATED ORAL at 18:04

## 2019-08-11 RX ADMIN — SODIUM CHLORIDE: 9 INJECTION, SOLUTION INTRAVENOUS at 11:16

## 2019-08-11 RX ADMIN — SODIUM CHLORIDE: 9 INJECTION, SOLUTION INTRAVENOUS at 21:21

## 2019-08-11 RX ADMIN — ATORVASTATIN CALCIUM 80 MG: 40 TABLET, FILM COATED ORAL at 21:20

## 2019-08-11 RX ADMIN — CARVEDILOL 3.12 MG: 3.12 TABLET, FILM COATED ORAL at 12:31

## 2019-08-11 RX ADMIN — ASPIRIN 81 MG 81 MG: 81 TABLET ORAL at 08:01

## 2019-08-11 RX ADMIN — HUMAN ALBUMIN MICROSPHERES AND PERFLUTREN 3 ML: 10; .22 INJECTION, SOLUTION INTRAVENOUS at 08:01

## 2019-08-11 ASSESSMENT — ACTIVITIES OF DAILY LIVING (ADL)
ADLS_ACUITY_SCORE: 10

## 2019-08-11 NOTE — PLAN OF CARE
Orientation: Alert and oriented x4  VSS. 96% on RA. Afebrile.   Tele: SR . HR 70s.   LS: clear and equal  bilaterally  GI: Passing gas. no BM. Denies N/V.   : Adequate urine output.   Skin: scabs. Skin otherwise intact  Activity: Independent. Pt slept comfortably throughout shift.   Pain: 0/10. Denies pain throughout shift. No PRN interventions utilized  Plan: Continue with current cares. Hep gtt maintained at 9.5 ml/hr.

## 2019-08-11 NOTE — PROGRESS NOTES
"Cardiology Progress Note   Date of service: 19       Assessment and Plan:     1. Possible ACS with abrupt onset chest pain and mild troponin elevation    Cardiac catheterization with possible PCI is warranted. We discussed risks and benefits.    No contraindications for DENILSON if needed.    We discussed that if no significant CAD is found, other etiologies such as thromboembolism, spasm or aborted MI could have happened.                   Interval History:   Feeling well without return of chest discomfort. Only minor troponin elevation and echo without RWMA or valvular heart disease.              Review of Systems:   As per subjective, otherwise 5 systems reviewed and negative.           Physical Exam:     Vitals were reviewed  Blood pressure 124/72, pulse 80, temperature 97.4  F (36.3  C), temperature source Oral, resp. rate 16, height 1.803 m (5' 11\"), weight 83.8 kg (184 lb 12.8 oz), SpO2 99 %.  Temperatures:  Current - Temp: 97.4  F (36.3  C); Max - Temp  Av  F (36.7  C)  Min: 97.4  F (36.3  C)  Max: 98.4  F (36.9  C)  Respiration range: Resp  Av.2  Min: 16  Max: 18  Pulse range: Pulse  Av  Min: 80  Max: 84  Blood pressure range: Systolic (24hrs), Av , Min:124 , Max:137   ; Diastolic (24hrs), Av, Min:50, Max:82    Pulse oximetry range: SpO2  Av.6 %  Min: 96 %  Max: 99 %    Intake/Output Summary (Last 24 hours) at 2019 0938  Last data filed at 2019 0724  Gross per 24 hour   Intake 960 ml   Output --   Net 960 ml       Constitutional:   awake, alert, cooperative, no apparent distress, and appears stated age     Eyes:   Lids and lashes normal, pupils equal, round and reactive to light, extra ocular muscles intact, sclera clear, conjunctiva normal     Neck:   supple, symmetrical, trachea midline, no JVD     Back:   symmetric     Lungs:   No increased work of breathing, good air exchange, clear to auscultation bilaterally, no crackles or wheezing     Cardiovascular:   RRR, no " significant murmurs.     Abdomen:   benign     Musculoskeletal:   motor strength is 5 out of 5 all extremities bilaterally     Neurologic:   Grossly nonfocal     Skin:   normal skin color, texture, turgor     Additional findings:   No edema              Medications:     Current Facility-Administered Medications Ordered in Epic   Medication Dose Route Frequency Last Rate Last Dose     aspirin (ASA) chewable tablet 81 mg  81 mg Oral Daily   81 mg at 08/11/19 0801     atorvastatin (LIPITOR) tablet 80 mg  80 mg Oral QPM         carvedilol (COREG) tablet 3.125 mg  3.125 mg Oral BID w/meals         heparin infusion 25,000 units in 0.45% NaCl 250 mL  950 Units/hr Intravenous Continuous 9.5 mL/hr at 08/11/19 0702 950 Units/hr at 08/11/19 0702     lidocaine (LMX4) cream   Topical Q1H PRN         lidocaine 1 % 0.1-1 mL  0.1-1 mL Other Q1H PRN         magnesium sulfate 4 g in 100 mL sterile water (premade)  4 g Intravenous Q4H PRN         melatonin tablet 1 mg  1 mg Oral At Bedtime PRN         metoprolol tartrate (LOPRESSOR) half-tab 12.5 mg  12.5 mg Oral BID   12.5 mg at 08/11/19 0801     naloxone (NARCAN) injection 0.1-0.4 mg  0.1-0.4 mg Intravenous Q2 Min PRN         Patient is already receiving anticoagulation with heparin, enoxaparin (LOVENOX), warfarin (COUMADIN)  or other anticoagulant medication   Does not apply Continuous PRN         potassium chloride (KLOR-CON) Packet 20-40 mEq  20-40 mEq Oral or Feeding Tube Q2H PRN         potassium chloride 10 mEq in 100 mL intermittent infusion with 10 mg lidocaine  10 mEq Intravenous Q1H PRN         potassium chloride 10 mEq in 100 mL sterile water intermittent infusion (premix)  10 mEq Intravenous Q1H PRN         potassium chloride 20 mEq in 50 mL intermittent infusion  20 mEq Intravenous Q1H PRN         potassium chloride ER (K-DUR/KLOR-CON M) CR tablet 20-40 mEq  20-40 mEq Oral Q2H PRN         sodium chloride (PF) 0.9% PF flush 3 mL  3 mL Intracatheter q1 min prn          "sodium chloride (PF) 0.9% PF flush 3 mL  3 mL Intracatheter Q8H         sodium chloride 0.9% infusion   Intravenous Continuous PRN         No current [facility]-ordered outpatient medications on file.                Data:   All laboratory data reviewed  Results for orders placed or performed during the hospital encounter of 08/10/19 (from the past 24 hour(s))   Troponin I   Result Value Ref Range    Troponin I ES 1.845 (HH) 0.000 - 0.045 ug/L   Heparin 10a Level   Result Value Ref Range    Heparin 10A Level 0.35 IU/mL   Nutrition Services Adult IP Consult    Narrative    Mariel Hair, MOISES, LD     8/10/2019 12:07 PM  CLINICAL NUTRITION SERVICES  -  BRIEF EDUCATION ASSESSMENT NOTE      Malnutrition: Patient does not meet malnutrition criteria at this   time        REASON FOR ASSESSMENT  Alex Alvares is a 53 year old male seen by Registered Dietitian   for Provider Order - Nutrition Education - heart healthy diet.        NUTRITION HISTORY  - Information obtained from patient and wife in room, chart.  - Admitted 2/2 chest pain, found to NSTEMI, plans for angio on   Monday.  - Patient works as a .  He has a good knowledge-base of   fiber-containing foods, plant-based proteins, saturated fat   content of foods, etc.  - He has been following a low carb diet at home more recently.    To him this includes an increase in vegetables, plant-based   proteins, minimal grains, and he also does not often consume   dairy products.  - NKFA.      CURRENT NUTRITION ORDERS  Diet Order:     Cardiac    Current Intake/Tolerance:  Good appetite since admit.      NUTRITION FOCUSED PHYSICAL ASSESSMENT FOR DIAGNOSING   MALNUTRITION)  Completed:  Yes Full assessment         Observed:    No fat or muscle loss observed    Obtained from Chart/Interdisciplinary Team:  No pertinent other than noted    ANTHROPOMETRICS  Height: 5' 11\"  Weight: 83.8 kg (184#)  Body mass index is 25.77 kg/m .  Weight Status:  Overweight BMI 25-29.9  IBW: 78.2 kg " "(172#)  % IBW: 107%  Weight History:  Wt Readings from Last 10 Encounters:   08/10/19 83.8 kg (184 lb 12.8 oz)   05/17/16 88.9 kg (196 lb)   04/08/13 87 kg (191 lb 11.2 oz)   11/30/08 83.9 kg (185 lb)   08/21/08 78.9 kg (174 lb)   08/20/08 79.4 kg (175 lb)   12/11/07 77.6 kg (171 lb)   07/29/07 70.3 kg (155 lb)   - Patient denies wt loss PTA.    LABS  Labs reviewed    MEDICATIONS  Medications reviewed      MALNUTRITION:  % Weight Loss:  None noted  % Intake:  No decreased intake noted  Subcutaneous Fat Loss:  None observed  Muscle Loss:  None observed  Fluid Retention:  None noted    Malnutrition Diagnosis: Patient does not meet two of the above   criteria necessary for diagnosing malnutrition    NUTRITION DIAGNOSIS:  Food and nutrition-related knowledge deficit related to lack of   exposure to MNT PTA as evidenced by new NSTEMI.      NUTRITION INTERVENTIONS  Recommendations / Nutrition Prescription  Continue cardiac diet at discharge.      Implementation  Nutrition education: Provided education on cardiac diet:  ? Assessed learning needs, learning preferences, and willingness   to learn  ? Nutrition Education (Content):  a) Provided handout \"heart healthy nutrition therapy\"  b) Discussed recommendations for increasing fiber intake via   fruits, vegetables, and whole grains  c) Discussed common foods which contain saturated fat,   appropriate substitutes  d) Discussed common foods which contain trans fat, appropriate   substitutes  e) Discussed common foods which are high in sodium, appropriate   substitutes  f) Discussed how to read nutrition facts label  ? Nutrition Education (Application):  a) Discussed eating habits and recommended alternative food   choices  ? Patient verbalizes understanding of diet by stating desire to   monitor saturated fat content of diet.  ? Anticipate good compliance  ? Diet Education - refer to Education Flowsheet    Collaboration and Referral of Nutrition care: Discussed POC with "   team during rounds.      Nutrition Goals  Patient to verbalize 2 foods that contain saturated fat.      MONITORING AND EVALUATION:  Progress towards goals will be monitored and evaluated per   protocol and Practice Guidelines            Mariel Hair RD, LD  Clinical Dietitian  3rd floor/ICU: [phone]  All other floors: [phone]  Weekend/holiday: [phone]   Troponin I   Result Value Ref Range    Troponin I ES 1.704 (HH) 0.000 - 0.045 ug/L   Troponin I   Result Value Ref Range    Troponin I ES 1.552 (HH) 0.000 - 0.045 ug/L   Troponin I   Result Value Ref Range    Troponin I ES 1.184 (HH) 0.000 - 0.045 ug/L   Troponin I   Result Value Ref Range    Troponin I ES 1.185 (HH) 0.000 - 0.045 ug/L   Basic metabolic panel   Result Value Ref Range    Sodium 140 133 - 144 mmol/L    Potassium 4.0 3.4 - 5.3 mmol/L    Chloride 110 (H) 94 - 109 mmol/L    Carbon Dioxide 27 20 - 32 mmol/L    Anion Gap 3 3 - 14 mmol/L    Glucose 122 (H) 70 - 99 mg/dL    Urea Nitrogen 17 7 - 30 mg/dL    Creatinine 0.82 0.66 - 1.25 mg/dL    GFR Estimate >90 >60 mL/min/[1.73_m2]    GFR Estimate If Black >90 >60 mL/min/[1.73_m2]    Calcium 8.2 (L) 8.5 - 10.1 mg/dL   Lipid Profile   Result Value Ref Range    Cholesterol 199 <200 mg/dL    Triglycerides 168 (H) <150 mg/dL    HDL Cholesterol 36 (L) >39 mg/dL    LDL Cholesterol Calculated 129 (H) <100 mg/dL    Non HDL Cholesterol 163 (H) <130 mg/dL   Troponin I   Result Value Ref Range    Troponin I ES 1.116 (HH) 0.000 - 0.045 ug/L   Heparin Xa (10a) Level   Result Value Ref Range    Heparin 10A Level 0.25 IU/mL   Hemoglobin A1c   Result Value Ref Range    Hemoglobin A1C 5.6 0 - 5.6 %   Echocardiogram Complete    Narrative    [id number]  [id number]  [id number]  [id number]^AUSTYN^ELOY^NIDIA           [facility]  Echocardiography Laboratory  [address] [place] [address]        Name: CRISTINA SANTOS  MRN: [id number]  : 1965  Study Date: 2019 06:58 AM  Age: 53  yrs  Gender: Male  Patient Location: [facility]  Reason For Study: Chest Pain  Ordering Physician: ELOY ZAMAN  Performed By: Odessa Ballard     BSA: 2.0 m2  Height: 71 in  Weight: 184 lb  HR: 74  BP: 116/61 mmHg  _____________________________________________________________________________  __        Procedure  Complete Portable Echo Adult. Optison (NDC #[id number]) given intravenously.  _____________________________________________________________________________  __        Interpretation Summary     The left ventricle is normal in size.  The visual ejection fraction is estimated at 60-65%.  No regional wall motion abnormalities noted.  Normal cardiac valves.  _____________________________________________________________________________  __        Left Ventricle  The left ventricle is normal in size. There is normal left ventricular wall  thickness. The visual ejection fraction is estimated at 60-65%. Diastolic  Doppler findings (E/E' ratio and/or other parameters) suggest left ventricular  filling pressures are indeterminate. No regional wall motion abnormalities  noted.     Right Ventricle  The right ventricle is normal in size and function.     Atria  Normal left atrial size. Right atrial size is normal. There is no color  Doppler evidence of an atrial shunt.     Mitral Valve  The mitral valve is normal in structure and function. There is trace mitral  regurgitation.        Tricuspid Valve  There is trace tricuspid regurgitation. The right ventricular systolic  pressure is approximated at 21.2 mmHg plus the right atrial pressure. IVC  diameter <2.1 cm collapsing >50% with sniff suggests a normal RA pressure of 3  mmHg.     Aortic Valve  Normal tricuspid aortic valve. No aortic regurgitation is present.     Pulmonic Valve  The pulmonic valve is not well seen, but is grossly normal. There is trace  pulmonic valvular regurgitation.     Vessels  Normal size aorta. The aortic root is normal size.     Pericardium  There  is no pericardial effusion.     _____________________________________________________________________________  __  MMode/2D Measurements & Calculations  IVSd: 1.1 cm  LVIDd: 4.3 cm  LVIDs: 3.0 cm  LVPWd: 1.1 cm  FS: 29.6 %     LV mass(C)d: 160.0 grams  LV mass(C)dI: 78.6 grams/m2  Ao root diam: 2.9 cm  LA dimension: 3.4 cm  asc Aorta Diam: 2.7 cm  LA/Ao: 1.1  LVOT diam: 2.0 cm  LVOT area: 3.3 cm2  LA Volume (BP): 48.6 ml  LA Volume Index (BP): 23.8 ml/m2  RWT: 0.50        Doppler Measurements & Calculations  MV E max ko: 87.3 cm/sec  MV A max ko: 62.1 cm/sec  MV E/A: 1.4     MV dec time: 0.17 sec  LV V1 max PG: 3.2 mmHg  LV V1 max: 89.7 cm/sec  LV V1 VTI: 21.5 cm  SV(LVOT): 70.3 ml  SI(LVOT): 34.6 ml/m2  PA acc time: 0.10 sec  TR max ko: 230.4 cm/sec  TR max P.2 mmHg  E/E' av.4  Lateral E/e': 8.1  Medial E/e': 8.7           _____________________________________________________________________________  __           Report approved by: Carroll Lopez 2019 08:51 AM          All laboratory data reviewed  Lab Results   Component Value Date    CHOL 199 2019     Lab Results   Component Value Date    HDL 36 2019     Lab Results   Component Value Date     2019     Lab Results   Component Value Date    TRIG 168 2019     No results found for: CHOLHDLRATIO  No results found for: TSH  Last Basic Metabolic Panel:  Lab Results   Component Value Date     2019      Lab Results   Component Value Date    POTASSIUM 4.0 2019     Lab Results   Component Value Date    CHLORIDE 110 2019     Lab Results   Component Value Date    FLOYD 8.2 2019     Lab Results   Component Value Date    CO2 27 2019     Lab Results   Component Value Date    BUN 17 2019     Lab Results   Component Value Date    CR 0.82 2019     Lab Results   Component Value Date     2019     Lab Results   Component Value Date    WBC 9.3 08/10/2019     Lab Results    Component Value Date    RBC 5.52 08/10/2019     Lab Results   Component Value Date    HGB 16.4 08/10/2019     Lab Results   Component Value Date    HCT 47.0 08/10/2019     Lab Results   Component Value Date    MCV 85 08/10/2019     Lab Results   Component Value Date    MCH 29.7 08/10/2019     Lab Results   Component Value Date    MCHC 34.9 08/10/2019     Lab Results   Component Value Date    RDW 12.0 08/10/2019     Lab Results   Component Value Date     08/10/2019

## 2019-08-11 NOTE — PLAN OF CARE
VSS A and Ox4. Pt. Abulated outdoors independently with wife. Denies SOB, pain, or nausea. Pt. Now resting in bed. Will continue to monitor.

## 2019-08-11 NOTE — PLAN OF CARE
Pt. A&Ox4, up independently, Tele: SR, Lung sounds clear, room air sat's 97%. Heparin drip at 950 units/hr, next Hep 10a due in AM. Pt. Denies any c/o chest pain, Peak troponin=1.845. IVF infusing without difficulty. Plan: Echo in AM, Angiogram on Monday. Pt. Denies any needs at this time. Will continue with POC.

## 2019-08-11 NOTE — PLAN OF CARE
RN SHIFT SUMMARY :  DX/STORY : admit  from home early 8/10 with CP radiating down left arm. NSTEMI- plans on C.Angio on Monday .  NSTEMI   HX : had MRSA in old neck cellulitis 10 yrs ago. Had Neg. Cult 3/12/12- states hes had 3 neg   LABS/PROTOCOLS : K & MG protocols + RX monitoring IV Heparin - Hep At 950 units  PROCEDURES: Chest CT Neg., ECHO EF 60-65%  TELE : SR   ASSESS : a/o, up in room with SBA. No CP since arrival to floor.    TEACHING : Gave  info on angio & all his current Meds   Ordered Diet consult for Heart Healthy diet-done   PLAN : at baseline is  , works as , & from [place]. Cont POC of Heparin Gtt,  Cardiology consult pending . Adding meds & adjusting . Angio. Monday .

## 2019-08-11 NOTE — PROGRESS NOTES
[facility]    Hospitalist Progress Note      Assessment & Plan   Alex Alvares is a 53 year old male who was admitted on 8/10/2019.    Summary of Stay:   Alex Alvares is a 53-year-old man who was brought to the emergency department by his wife after he had abrupt onset of severe chest pain that also radiated to his left shoulder and down his left arm.  He was admitted for NSTEMI.    He remains chest pain-free.  Troponins are trending down.  On heparin infusion.  Plan for cardiac cath on Monday.    Plan:    Non-ST elevation acute coronary syndrome  -IV heparin, aspirin, high intensity statin, beta-blocker.  - Echo done, pending.  -Cardiology consult appreciated.  - Cardiac cath tomorrow.  - We will also check hemoglobin A1c.    Dyslipidemia  - .  HDL 36.  - Discussed lifestyle modification.  He was visited by nutritionist.  -On high intensity statin.  Outpatient monitoring.      DVT Prophylaxis: IV heparin  Code Status: Full Code  Expected discharge: Next 1 to 2 days, after cardiac cath.    Ulisses Otero MD  Text Page (7am - 6pm, M-F)    Interval History   Patient was evaluated with nursing staff. Overnight issues discussed.  Continues to feel well.  Denies any chest pain or shortness of breath.  No nausea or vomiting.  Ambulating without any difficulty.    -Data reviewed today: Labs and medications.    Physical Exam   Temp: 97.4  F (36.3  C) Temp src: Oral BP: 124/72 Pulse: 80 Heart Rate: 84 Resp: 16 SpO2: 99 % O2 Device: None (Room air)    Vitals:    08/10/19 0343 08/10/19 0526   Weight: 84.4 kg (186 lb 1.1 oz) 83.8 kg (184 lb 12.8 oz)     Vital Signs with Ranges  Temp:  [97.4  F (36.3  C)-98.4  F (36.9  C)] 97.4  F (36.3  C)  Pulse:  [80-84] 80  Heart Rate:  [84-85] 84  Resp:  [16-18] 16  BP: (124-137)/(50-82) 124/72  SpO2:  [96 %-99 %] 99 %  I/O last 3 completed shifts:  In: 954 [I.V.:954]  Out: -     Constitutional: Awake, alert, cooperative, no apparent distress  HEENT: Trachea midline,  sclera is clear   Respiratory: Clear to auscultation bilaterally, no crackles or wheezing  Cardiovascular: Regular rate and rhythm, normal S1 and S2, and no murmur noted  GI: Normal bowel sounds, soft, non-distended, non-tender  Skin/Integumen: No rashes, no cyanosis, no edema  Psych: appropriate affect, no agitation   Extremities: No pitting edema     Medications     HEParin 950 Units/hr (08/11/19 0702)     - MEDICATION INSTRUCTIONS -       sodium chloride         aspirin  81 mg Oral Daily     atorvastatin  80 mg Oral QPM     metoprolol tartrate  12.5 mg Oral BID     sodium chloride (PF)  3 mL Intracatheter Q8H       Data   Recent Labs   Lab 08/11/19  0601 08/11/19  0155 08/10/19  2204  08/10/19  0634  08/10/19  0029   WBC  --   --   --   --  9.3  --  8.9   HGB  --   --   --   --  16.4  --  16.9   MCV  --   --   --   --  85  --  85   PLT  --   --   --   --  241  --  260     --   --   --   --   --  137   POTASSIUM 4.0  --   --   --   --   --  3.7   CHLORIDE 110*  --   --   --   --   --  102   CO2 27  --   --   --   --   --  32   BUN 17  --   --   --   --   --  17   CR 0.82  --   --   --   --   --  1.07   ANIONGAP 3  --   --   --   --   --  3   FLOYD 8.2*  --   --   --   --   --  9.1   *  --   --   --   --   --  101*   ALBUMIN  --   --   --   --   --   --  4.1   PROTTOTAL  --   --   --   --   --   --  8.8   BILITOTAL  --   --   --   --   --   --  0.5   ALKPHOS  --   --   --   --   --   --  101   ALT  --   --   --   --   --   --  59   AST  --   --   --   --   --   --  29   LIPASE  --   --   --   --   --   --  409*   TROPI 1.116* 1.185* 1.184*   < > 1.502*   < > 0.035    < > = values in this interval not displayed.       No results found for this or any previous visit (from the past 24 hour(s)).

## 2019-08-12 ENCOUNTER — SURGERY (OUTPATIENT)
Age: 54
End: 2019-08-12
Payer: COMMERCIAL

## 2019-08-12 VITALS
WEIGHT: 184.8 LBS | TEMPERATURE: 98.4 F | OXYGEN SATURATION: 99 % | RESPIRATION RATE: 16 BRPM | DIASTOLIC BLOOD PRESSURE: 77 MMHG | SYSTOLIC BLOOD PRESSURE: 127 MMHG | BODY MASS INDEX: 25.87 KG/M2 | HEART RATE: 74 BPM | HEIGHT: 71 IN

## 2019-08-12 LAB
CHOLEST SERPL-MCNC: 177 MG/DL
HDLC SERPL-MCNC: 40 MG/DL
INTERPRETATION ECG - MUSE: NORMAL
KCT BLD-ACNC: 292 SEC (ref 75–150)
LDLC SERPL CALC-MCNC: 112 MG/DL
LMWH PPP CHRO-ACNC: 0.29 IU/ML
NONHDLC SERPL-MCNC: 137 MG/DL
POTASSIUM SERPL-SCNC: 3.9 MMOL/L (ref 3.4–5.3)
TRIGL SERPL-MCNC: 127 MG/DL

## 2019-08-12 PROCEDURE — C1874 STENT, COATED/COV W/DEL SYS: HCPCS | Performed by: INTERNAL MEDICINE

## 2019-08-12 PROCEDURE — C9600 PERC DRUG-EL COR STENT SING: HCPCS | Performed by: INTERNAL MEDICINE

## 2019-08-12 PROCEDURE — 93571 IV DOP VEL&/PRESS C FLO 1ST: CPT | Mod: 26 | Performed by: INTERNAL MEDICINE

## 2019-08-12 PROCEDURE — 36415 COLL VENOUS BLD VENIPUNCTURE: CPT | Performed by: INTERNAL MEDICINE

## 2019-08-12 PROCEDURE — B2111ZZ FLUOROSCOPY OF MULTIPLE CORONARY ARTERIES USING LOW OSMOLAR CONTRAST: ICD-10-PCS | Performed by: INTERNAL MEDICINE

## 2019-08-12 PROCEDURE — 99152 MOD SED SAME PHYS/QHP 5/>YRS: CPT | Performed by: INTERNAL MEDICINE

## 2019-08-12 PROCEDURE — 93454 CORONARY ARTERY ANGIO S&I: CPT | Mod: XU | Performed by: INTERNAL MEDICINE

## 2019-08-12 PROCEDURE — 027034Z DILATION OF CORONARY ARTERY, ONE ARTERY WITH DRUG-ELUTING INTRALUMINAL DEVICE, PERCUTANEOUS APPROACH: ICD-10-PCS | Performed by: INTERNAL MEDICINE

## 2019-08-12 PROCEDURE — 93571 IV DOP VEL&/PRESS C FLO 1ST: CPT | Performed by: INTERNAL MEDICINE

## 2019-08-12 PROCEDURE — 25000128 H RX IP 250 OP 636: Performed by: INTERNAL MEDICINE

## 2019-08-12 PROCEDURE — 85520 HEPARIN ASSAY: CPT | Performed by: INTERNAL MEDICINE

## 2019-08-12 PROCEDURE — 4A02XM4 MEASUREMENT OF CARDIAC TOTAL ACTIVITY, EXTERNAL APPROACH: ICD-10-PCS | Performed by: INTERNAL MEDICINE

## 2019-08-12 PROCEDURE — 25000125 ZZHC RX 250: Performed by: INTERNAL MEDICINE

## 2019-08-12 PROCEDURE — 25800030 ZZH RX IP 258 OP 636: Performed by: INTERNAL MEDICINE

## 2019-08-12 PROCEDURE — C1894 INTRO/SHEATH, NON-LASER: HCPCS | Performed by: INTERNAL MEDICINE

## 2019-08-12 PROCEDURE — C1725 CATH, TRANSLUMIN NON-LASER: HCPCS | Performed by: INTERNAL MEDICINE

## 2019-08-12 PROCEDURE — 93454 CORONARY ARTERY ANGIO S&I: CPT | Mod: 26 | Performed by: INTERNAL MEDICINE

## 2019-08-12 PROCEDURE — 25000132 ZZH RX MED GY IP 250 OP 250 PS 637: Performed by: INTERNAL MEDICINE

## 2019-08-12 PROCEDURE — 85347 COAGULATION TIME ACTIVATED: CPT

## 2019-08-12 PROCEDURE — 80061 LIPID PANEL: CPT | Performed by: INTERNAL MEDICINE

## 2019-08-12 PROCEDURE — 99232 SBSQ HOSP IP/OBS MODERATE 35: CPT | Mod: 25 | Performed by: INTERNAL MEDICINE

## 2019-08-12 PROCEDURE — 92928 PRQ TCAT PLMT NTRAC ST 1 LES: CPT | Mod: LD | Performed by: INTERNAL MEDICINE

## 2019-08-12 PROCEDURE — 27210794 ZZH OR GENERAL SUPPLY STERILE: Performed by: INTERNAL MEDICINE

## 2019-08-12 PROCEDURE — C1769 GUIDE WIRE: HCPCS | Performed by: INTERNAL MEDICINE

## 2019-08-12 PROCEDURE — 99239 HOSP IP/OBS DSCHRG MGMT >30: CPT | Performed by: HOSPITALIST

## 2019-08-12 PROCEDURE — 84132 ASSAY OF SERUM POTASSIUM: CPT | Performed by: INTERNAL MEDICINE

## 2019-08-12 PROCEDURE — 93005 ELECTROCARDIOGRAM TRACING: CPT

## 2019-08-12 PROCEDURE — C1887 CATHETER, GUIDING: HCPCS | Performed by: INTERNAL MEDICINE

## 2019-08-12 DEVICE — STENT SYNERGY DRUG ELUTING 2.25X12MM  H7493926012220: Type: IMPLANTABLE DEVICE | Status: FUNCTIONAL

## 2019-08-12 RX ORDER — VERAPAMIL HYDROCHLORIDE 2.5 MG/ML
INJECTION, SOLUTION INTRAVENOUS
Status: DISCONTINUED
Start: 2019-08-12 | End: 2019-08-12 | Stop reason: HOSPADM

## 2019-08-12 RX ORDER — CARVEDILOL 3.12 MG/1
3.12 TABLET ORAL 2 TIMES DAILY WITH MEALS
Qty: 60 TABLET | Refills: 0 | Status: SHIPPED | OUTPATIENT
Start: 2019-08-12 | End: 2021-02-10

## 2019-08-12 RX ORDER — NITROGLYCERIN 5 MG/ML
VIAL (ML) INTRAVENOUS
Status: DISCONTINUED | OUTPATIENT
Start: 2019-08-12 | End: 2019-08-12 | Stop reason: HOSPADM

## 2019-08-12 RX ORDER — LORAZEPAM 2 MG/ML
0.5 INJECTION INTRAMUSCULAR
Status: DISCONTINUED | OUTPATIENT
Start: 2019-08-12 | End: 2019-08-12 | Stop reason: HOSPADM

## 2019-08-12 RX ORDER — DOBUTAMINE HYDROCHLORIDE 200 MG/100ML
2-20 INJECTION INTRAVENOUS CONTINUOUS PRN
Status: DISCONTINUED | OUTPATIENT
Start: 2019-08-12 | End: 2019-08-12 | Stop reason: HOSPADM

## 2019-08-12 RX ORDER — DOPAMINE HYDROCHLORIDE 160 MG/100ML
2-20 INJECTION, SOLUTION INTRAVENOUS CONTINUOUS PRN
Status: DISCONTINUED | OUTPATIENT
Start: 2019-08-12 | End: 2019-08-12 | Stop reason: HOSPADM

## 2019-08-12 RX ORDER — HEPARIN SODIUM 1000 [USP'U]/ML
INJECTION, SOLUTION INTRAVENOUS; SUBCUTANEOUS
Status: DISCONTINUED | OUTPATIENT
Start: 2019-08-12 | End: 2019-08-12 | Stop reason: HOSPADM

## 2019-08-12 RX ORDER — SODIUM CHLORIDE 9 MG/ML
INJECTION, SOLUTION INTRAVENOUS CONTINUOUS
Status: DISCONTINUED | OUTPATIENT
Start: 2019-08-12 | End: 2019-08-12 | Stop reason: HOSPADM

## 2019-08-12 RX ORDER — FENTANYL CITRATE 50 UG/ML
INJECTION, SOLUTION INTRAMUSCULAR; INTRAVENOUS
Status: DISCONTINUED | OUTPATIENT
Start: 2019-08-12 | End: 2019-08-12 | Stop reason: HOSPADM

## 2019-08-12 RX ORDER — POTASSIUM CHLORIDE 1500 MG/1
20 TABLET, EXTENDED RELEASE ORAL
Status: COMPLETED | OUTPATIENT
Start: 2019-08-12 | End: 2019-08-12

## 2019-08-12 RX ORDER — NITROGLYCERIN 5 MG/ML
VIAL (ML) INTRAVENOUS
Status: DISCONTINUED
Start: 2019-08-12 | End: 2019-08-12 | Stop reason: HOSPADM

## 2019-08-12 RX ORDER — NOREPINEPHRINE BITARTRATE 0.06 MG/ML
.03-.4 INJECTION, SOLUTION INTRAVENOUS CONTINUOUS PRN
Status: DISCONTINUED | OUTPATIENT
Start: 2019-08-12 | End: 2019-08-12 | Stop reason: HOSPADM

## 2019-08-12 RX ORDER — ATROPINE SULFATE 0.1 MG/ML
0.5 INJECTION INTRAVENOUS EVERY 5 MIN PRN
Status: DISCONTINUED | OUTPATIENT
Start: 2019-08-12 | End: 2019-08-12 | Stop reason: HOSPADM

## 2019-08-12 RX ORDER — LIDOCAINE HYDROCHLORIDE 10 MG/ML
INJECTION, SOLUTION EPIDURAL; INFILTRATION; INTRACAUDAL; PERINEURAL
Status: DISCONTINUED
Start: 2019-08-12 | End: 2019-08-12 | Stop reason: HOSPADM

## 2019-08-12 RX ORDER — ATORVASTATIN CALCIUM 80 MG/1
80 TABLET, FILM COATED ORAL EVERY EVENING
Qty: 30 TABLET | Refills: 0 | Status: SHIPPED | OUTPATIENT
Start: 2019-08-12 | End: 2021-02-10

## 2019-08-12 RX ORDER — IOPAMIDOL 755 MG/ML
INJECTION, SOLUTION INTRAVASCULAR
Status: DISCONTINUED | OUTPATIENT
Start: 2019-08-12 | End: 2019-08-12 | Stop reason: HOSPADM

## 2019-08-12 RX ORDER — NITROGLYCERIN 20 MG/100ML
.07-2 INJECTION INTRAVENOUS CONTINUOUS PRN
Status: DISCONTINUED | OUTPATIENT
Start: 2019-08-12 | End: 2019-08-12 | Stop reason: HOSPADM

## 2019-08-12 RX ORDER — NALOXONE HYDROCHLORIDE 0.4 MG/ML
.1-.4 INJECTION, SOLUTION INTRAMUSCULAR; INTRAVENOUS; SUBCUTANEOUS
Status: DISCONTINUED | OUTPATIENT
Start: 2019-08-12 | End: 2019-08-12

## 2019-08-12 RX ORDER — FENTANYL CITRATE 50 UG/ML
25-50 INJECTION, SOLUTION INTRAMUSCULAR; INTRAVENOUS
Status: DISCONTINUED | OUTPATIENT
Start: 2019-08-12 | End: 2019-08-12 | Stop reason: HOSPADM

## 2019-08-12 RX ORDER — FLUMAZENIL 0.1 MG/ML
0.2 INJECTION, SOLUTION INTRAVENOUS
Status: DISCONTINUED | OUTPATIENT
Start: 2019-08-12 | End: 2019-08-12 | Stop reason: HOSPADM

## 2019-08-12 RX ORDER — LIDOCAINE 40 MG/G
CREAM TOPICAL
Status: DISCONTINUED | OUTPATIENT
Start: 2019-08-12 | End: 2019-08-12 | Stop reason: HOSPADM

## 2019-08-12 RX ORDER — ASPIRIN 81 MG/1
81 TABLET ORAL DAILY
Status: DISCONTINUED | OUTPATIENT
Start: 2019-08-13 | End: 2019-08-12 | Stop reason: HOSPADM

## 2019-08-12 RX ORDER — ACETAMINOPHEN 325 MG/1
650 TABLET ORAL EVERY 4 HOURS PRN
Status: DISCONTINUED | OUTPATIENT
Start: 2019-08-12 | End: 2019-08-12 | Stop reason: HOSPADM

## 2019-08-12 RX ORDER — LISINOPRIL 2.5 MG/1
2.5 TABLET ORAL DAILY
Qty: 30 TABLET | Refills: 0 | Status: SHIPPED | OUTPATIENT
Start: 2019-08-12 | End: 2021-02-10

## 2019-08-12 RX ORDER — HYDROCODONE BITARTRATE AND ACETAMINOPHEN 5; 325 MG/1; MG/1
1-2 TABLET ORAL EVERY 4 HOURS PRN
Status: DISCONTINUED | OUTPATIENT
Start: 2019-08-12 | End: 2019-08-12 | Stop reason: HOSPADM

## 2019-08-12 RX ORDER — NITROGLYCERIN 0.4 MG/1
0.4 TABLET SUBLINGUAL EVERY 5 MIN PRN
Status: DISCONTINUED | OUTPATIENT
Start: 2019-08-12 | End: 2019-08-12 | Stop reason: HOSPADM

## 2019-08-12 RX ORDER — NALOXONE HYDROCHLORIDE 0.4 MG/ML
.2-.4 INJECTION, SOLUTION INTRAMUSCULAR; INTRAVENOUS; SUBCUTANEOUS
Status: DISCONTINUED | OUTPATIENT
Start: 2019-08-12 | End: 2019-08-12

## 2019-08-12 RX ORDER — ARGATROBAN 1 MG/ML
150 INJECTION, SOLUTION INTRAVENOUS
Status: DISCONTINUED | OUTPATIENT
Start: 2019-08-12 | End: 2019-08-12 | Stop reason: HOSPADM

## 2019-08-12 RX ORDER — VERAPAMIL HYDROCHLORIDE 2.5 MG/ML
INJECTION, SOLUTION INTRAVENOUS
Status: DISCONTINUED | OUTPATIENT
Start: 2019-08-12 | End: 2019-08-12 | Stop reason: HOSPADM

## 2019-08-12 RX ORDER — ARGATROBAN 1 MG/ML
350 INJECTION, SOLUTION INTRAVENOUS
Status: DISCONTINUED | OUTPATIENT
Start: 2019-08-12 | End: 2019-08-12 | Stop reason: HOSPADM

## 2019-08-12 RX ORDER — FENTANYL CITRATE 50 UG/ML
INJECTION, SOLUTION INTRAMUSCULAR; INTRAVENOUS
Status: DISCONTINUED
Start: 2019-08-12 | End: 2019-08-12 | Stop reason: HOSPADM

## 2019-08-12 RX ORDER — LORAZEPAM 0.5 MG/1
0.5 TABLET ORAL
Status: DISCONTINUED | OUTPATIENT
Start: 2019-08-12 | End: 2019-08-12 | Stop reason: HOSPADM

## 2019-08-12 RX ORDER — HEPARIN SODIUM 1000 [USP'U]/ML
INJECTION, SOLUTION INTRAVENOUS; SUBCUTANEOUS
Status: DISCONTINUED
Start: 2019-08-12 | End: 2019-08-12 | Stop reason: HOSPADM

## 2019-08-12 RX ADMIN — FENTANYL CITRATE 25 MCG: 50 INJECTION, SOLUTION INTRAMUSCULAR; INTRAVENOUS at 12:49

## 2019-08-12 RX ADMIN — MIDAZOLAM 0.5 MG: 1 INJECTION INTRAMUSCULAR; INTRAVENOUS at 13:07

## 2019-08-12 RX ADMIN — HEPARIN SODIUM 5000 UNITS: 1000 INJECTION, SOLUTION INTRAVENOUS; SUBCUTANEOUS at 12:29

## 2019-08-12 RX ADMIN — NITROGLYCERIN 400 MCG: 5 INJECTION, SOLUTION INTRAVENOUS at 13:12

## 2019-08-12 RX ADMIN — POTASSIUM CHLORIDE 20 MEQ: 1500 TABLET, EXTENDED RELEASE ORAL at 08:53

## 2019-08-12 RX ADMIN — MIDAZOLAM 1 MG: 1 INJECTION INTRAMUSCULAR; INTRAVENOUS at 12:30

## 2019-08-12 RX ADMIN — FENTANYL CITRATE 25 MCG: 50 INJECTION, SOLUTION INTRAMUSCULAR; INTRAVENOUS at 13:07

## 2019-08-12 RX ADMIN — FENTANYL CITRATE 50 MCG: 50 INJECTION, SOLUTION INTRAMUSCULAR; INTRAVENOUS at 12:30

## 2019-08-12 RX ADMIN — IOPAMIDOL 333 ML: 755 INJECTION, SOLUTION INTRAVENOUS at 13:27

## 2019-08-12 RX ADMIN — NITROGLYCERIN 400 MCG: 5 INJECTION, SOLUTION INTRAVENOUS at 13:01

## 2019-08-12 RX ADMIN — SODIUM CHLORIDE: 9 INJECTION, SOLUTION INTRAVENOUS at 06:25

## 2019-08-12 RX ADMIN — HEPARIN SODIUM 950 UNITS/HR: 10000 INJECTION, SOLUTION INTRAVENOUS at 03:28

## 2019-08-12 RX ADMIN — MIDAZOLAM 0.5 MG: 1 INJECTION INTRAMUSCULAR; INTRAVENOUS at 12:49

## 2019-08-12 RX ADMIN — CARVEDILOL 3.12 MG: 3.12 TABLET, FILM COATED ORAL at 18:22

## 2019-08-12 RX ADMIN — NITROGLYCERIN 400 MCG: 5 INJECTION, SOLUTION INTRAVENOUS at 12:30

## 2019-08-12 RX ADMIN — LIDOCAINE HYDROCHLORIDE 1 MG: 10 INJECTION, SOLUTION INFILTRATION; PERINEURAL at 12:29

## 2019-08-12 RX ADMIN — TICAGRELOR 180 MG: 90 TABLET ORAL at 12:46

## 2019-08-12 RX ADMIN — ASPIRIN 325 MG: 325 TABLET, DELAYED RELEASE ORAL at 08:53

## 2019-08-12 RX ADMIN — VERAPAMIL HYDROCHLORIDE 2.5 MG: 2.5 INJECTION, SOLUTION INTRAVENOUS at 12:30

## 2019-08-12 RX ADMIN — HEPARIN SODIUM 3000 UNITS: 1000 INJECTION, SOLUTION INTRAVENOUS; SUBCUTANEOUS at 12:46

## 2019-08-12 RX ADMIN — CARVEDILOL 3.12 MG: 3.12 TABLET, FILM COATED ORAL at 08:53

## 2019-08-12 ASSESSMENT — ACTIVITIES OF DAILY LIVING (ADL)
ADLS_ACUITY_SCORE: 10

## 2019-08-12 NOTE — CONSULTS
CLINICAL NUTRITION SERVICES - BRIEF NOTE    - Received standard post-angio heart healthy diet education consult.   - Education already completed, refer to RD assessment note from 8/10/2019.    - Will continue following while admitted.      Mariel Hair RD, LD  Clinical Dietitian  3rd floor/ICU: [phone]  All other floors: [phone]  Weekend/holiday: [phone]

## 2019-08-12 NOTE — PROGRESS NOTES
Brief cardiology note:    Cor angio today showed culprit 99% lesion in the D1 treated with DESx1. He was also noted to have a 60% lesion in the dRCA, neg by iFR (0.99). Brilinta 90mg BID added to his regimen.     Anticipate discharge later today after completing his TR band protocol with outpt follow up in 1-2 weeks. SARA JacobsC

## 2019-08-12 NOTE — PROCEDURES
[facility]    Procedure: *Cath with PCI  Date/Time: 8/12/2019 1:33 PM  Performed by: Alex Ocampo MD  Authorized by: Alex Ocampo MD     UNIVERSAL PROTOCOL   Site Marked: Yes  Prior Images Obtained and Reviewed:  Yes  Required items: Required blood products, implants, devices and special equipment available    Patient identity confirmed:  Verbally with patient  Patient was reevaluated immediately before administering moderate or deep sedation or anesthesia  Confirmation Checklist:  Patient's identity using two indicators     ANESTHESIA    Anesthesia: See MAR for details  Local Anesthetic:  Lidocaine 1% without epinephrine      SEDATION    Patient Sedated: Yes    Sedation Type:  Moderate (conscious) sedation  Vital signs: Vital signs monitored during sedation    PROCEDURE   Time of Sedation in Minutes by Physician:  60

## 2019-08-12 NOTE — PRE-PROCEDURE
I have examined the patient, reviewed the history, medications and pre procedural tests. He has sustained a NSTEMI.  I have explained to the patient the risks of death, MI, stroke, hematoma, possible urgent bypass surgery for failed PCI, use of stents, thienopyridine agents, possible peripheral vascular complications, arrhythmia, the use of FFR in clinical decision-making and alternative of medical therapy alone in regards to left heart catheterization, left ventriculography, coronary angiography, and possible percutaneous coronary intervention. The patient voiced understanding and wishes to proceed. The patient has a good right radial pulse, normal ulnar pulse and a normal Aron's sign.

## 2019-08-12 NOTE — DISCHARGE SUMMARY
[facility]    Discharge Summary  Hospitalist    Date of Admission:  8/10/2019  Date of Discharge:  8/12/2019  Provider:  Baljeet Mauro MD  Date of Service (when I last saw the patient): 08/12/19    Discharge Diagnoses   1. Non STEMI status post coronary angiogram and stent placement to first diagonal.   2. Dyslipidemia      Other medical issues:  Past Medical History:   Diagnosis Date     Bilateral high frequency sensorineural hearing loss        History of Present Illness   Alex Alvares is an 53 year old male who presented with acute chest pain.  Please see the admission history and physical for full details.    Hospital Course   Alex Alvares was admitted on 8/10/2019.  The following problems were addressed during his hospitalization:    He admitted with chest pain clinical compatible with ACS and elevated troponin with abnormal EKG, treated per protocol for non-STEMI, had a stable clinical course without complication.  Underwent coronary angiogram that found critical stenosis of the first diagonal with a stent placement.   He will go home on aspirin, Brilinta, carvedilol, and atorvastatin.  Cardiac diet Mediterranean-style type recommended.  He will continue follow-up with his primary care physician and cardiology.    # Discharge Pain Plan:    - Patient currently has NO PAIN and is not being prescribed pain medications on discharge.      Significant Results and Procedures   Coronary angio and stent placement    Pending Results      Unresulted Labs Ordered in the Past 30 Days of this Admission     No orders found from 7/11/2019 to 8/11/2019.          Code Status   Full Code       Primary Care Physician   [facility]    GEN:  Alert, oriented x 3, appears comfortable, NAD.  HEENT:  Normocephalic/atraumatic, no scleral icterus, no nasal discharge, mouth moist.  CV:  Regular rate and rhythm, no murmur or JVD.  S1 + S2 noted, no S3 or S4.  LUNGS:  Clear to auscultation  bilaterally without rales/rhonchi/wheezing/retractions.  Symmetric chest rise on inhalation noted.  ABD:  Active bowel sounds, soft, non-tender/non-distended.  No rebound/guarding/rigidity.  EXT:  No edema or cyanosis.  No joint synovitis noted.  SKIN:  Dry to touch, no exanthems noted in the visualized areas.     Discharge Disposition   Discharged to home    Consultations This Hospital Stay   PHARMACY TO DOSE HEPARIN  CARDIOLOGY IP CONSULT  PHARMACY TO DOSE HEPARIN  NUTRITION SERVICES ADULT IP CONSULT  PHARMACY DISCHARGE EDUCATION BY PHARMACIST  NUTRITION SERVICES ADULT IP CONSULT  CARDIAC REHAB IP CONSULT  PHARMACY IP CONSULT  PHARMACY IP CONSULT  SMOKING CESSATION PROGRAM IP CONSULT    Time Spent on this Encounter   I, Baljeet Mauro, personally saw the patient today and spent greater than 30 minutes discharging this patient.     Discharge Orders      CARDIAC REHAB REFERRAL      Discharge Order: F/U with Cardiac  KELLEE      Cardiac Rehab Referral      Reason for your hospital stay    Acute non STEMI with stent placement     Follow-up and recommended labs and tests     Follow up with primary care provider, [facility], within 7 days for hospital follow- up.  No follow up labs or test are needed.  Cardiology as per plan.     Activity    Your activity upon discharge: activity as tolerated     Full Code     Diet    Follow this diet upon discharge: Cardiac (mediterranean diet)     Discharge Medications   Current Discharge Medication List      START taking these medications    Details   aspirin (ASA) 81 MG EC tablet Take 1 tablet (81 mg) by mouth daily  Qty: 30 tablet, Refills: 0    Associated Diagnoses: NSTEMI (non-ST elevated myocardial infarction) (H)      atorvastatin (LIPITOR) 80 MG tablet Take 1 tablet (80 mg) by mouth every evening  Qty: 30 tablet, Refills: 0    Comments: Future refills by PCP Dr. [facility] with phone number [phone].  Associated Diagnoses:  NSTEMI (non-ST elevated myocardial infarction) (H)      carvedilol (COREG) 3.125 MG tablet Take 1 tablet (3.125 mg) by mouth 2 times daily (with meals)  Qty: 60 tablet, Refills: 0    Comments: Future refills by PCP Dr[facility] with phone number [phone].  Associated Diagnoses: NSTEMI (non-ST elevated myocardial infarction) (H)      lisinopril (PRINIVIL/ZESTRIL) 2.5 MG tablet Take 1 tablet (2.5 mg) by mouth daily  Qty: 30 tablet, Refills: 0    Associated Diagnoses: NSTEMI (non-ST elevated myocardial infarction) (H)      ticagrelor (BRILINTA) 90 MG tablet Take 1 tablet (90 mg) by mouth 2 times daily  Qty: 60 tablet, Refills: 0    Comments: Future refills by PCP Dr. [facility] with phone number [phone].  Associated Diagnoses: NSTEMI (non-ST elevated myocardial infarction) (H)           Allergies   No Known Allergies  Data   Most Recent 3 CBC's:  Recent Labs   Lab Test 08/10/19  0634 08/10/19  0029   WBC 9.3 8.9   HGB 16.4 16.9   MCV 85 85    260      Most Recent 3 BMP's:  Recent Labs   Lab Test 08/12/19  0713 08/11/19  0601 08/10/19  0029   NA  --  140 137   POTASSIUM 3.9 4.0 3.7   CHLORIDE  --  110* 102   CO2  --  27 32   BUN  --  17 17   CR  --  0.82 1.07   ANIONGAP  --  3 3   FLOYD  --  8.2* 9.1   GLC  --  122* 101*     Most Recent 2 LFT's:  Recent Labs   Lab Test 08/10/19  0029   AST 29   ALT 59   ALKPHOS 101   BILITOTAL 0.5     Most Recent INR's and Anticoagulation Dosing History:  Anticoagulation Dose History     There is no flowsheet data to display.        Most Recent 3 Troponin's:  Recent Labs   Lab Test 08/11/19  0601 08/11/19  0155 08/10/19  2204   TROPI 1.116* 1.185* 1.184*     Most Recent Cholesterol Panel:  Recent Labs   Lab Test 08/12/19  1514   CHOL 177   *   HDL 40   TRIG 127     Most Recent 6 Bacteria Isolates From Any Culture (See EPIC Reports for Culture Details):No lab results found.  Most Recent TSH, T4 and A1c Labs:  Recent  Labs   Lab Test 08/11/19  0601   A1C 5.6     Results for orders placed or performed during the hospital encounter of 08/10/19   CT Chest Pulmonary Embolism w Contrast    Narrative    EXAM: CT CHEST PULMONARY EMBOLISM W CONTRAST  LOCATION: [facility]  DATE/TIME: 8/10/2019 2:00 AM    INDICATION: Chest pain.  COMPARISON: None available.  TECHNIQUE: Helical acquisition through the chest was performed during the arterial phase of contrast enhancement using IV contrast. 2D and 3D reconstructions were performed by the CT technologist. Dose reduction techniques were used.   IV CONTRAST: 85mL Isovue-370    FINDINGS:  ANGIOGRAM CHEST: Pulmonary arteries are normal caliber and negative for pulmonary emboli. Normal caliber thoracic aorta with no dissection or aneurysm.    RV/LV RATIO: N/A    LUNGS AND PLEURA: Minimal subsegmental dependent atelectasis. The lungs are otherwise clear. No pneumothorax or pleural effusion.    MEDIASTINUM: Heart size is normal. Small calcifications of the left anterior descending coronary artery. No pericardial effusion. No mediastinal adenopathy.    LIMITED UPPER ABDOMEN: There is narrowing of the origin of the celiac artery which may be caused by extrinsic compression from the median arcuate ligament of the diaphragm. No focal inflammatory change.    MUSCULOSKELETAL: Negative.      Impression    CONCLUSION:  1.  Negative for pulmonary embolism or thoracic aortic aneurysm or dissection.    2.  Mild coronary artery disease.    3.  No evidence of pneumonia or pulmonary edema.     Pre-procedure Diagnosis     NSTEMI     Post-procedure Diagnosis     NSTEMI sp PCI D1  IFR distal RCA ( 0.99)      Conclusion     1.  Successful percutaneous coronary intervention with implantation of a 12 x 2.25 mm length everolimus eluting Synergy stent in the proximal segment of the first diagonal branch for management of a non-ST segment elevation MI.  There is a moderate narrowing in the distal right  coronary with a normal IFR.  We will plan to treat the remaining coronary disease with aggressive medical therapy.       Plan     1 aspirin 81 mg daily indefinitely  2 ticagrelor 90 mg twice daily for 1 year  3 high potency statin therapy  4 ideal blood pressure control with ACE inhibitors and beta-blockers  5 cardiac rehabilitation with Mediterranean-style diet, regular exercise program, and avoidance of tobacco   Coronary Findings     Diagnostic   Dominance: Right   Left Main   The vessel was visualized by selective angiography and is moderate in size. There was 0% vessel disease.   Left Anterior Descending   The left anterior descending coronary artery was selectively injected. The proximal segment has no significant narrowing. There is a smooth 20% mid vessel narrowing after the takeoff of the first septal  branch. The distal vessel has no significant narrowing. There is a moderate sized first diagonal branch that has a subtotal proximal narrowing before it bifurcates into superior and inferior rami.   First Diagonal Branch   1st Diag lesion is 99% stenosed. Culprit lesion. The lesion is type B1 - medium risk, focal and eccentric.   Left Circumflex   The large nondominant left circumflex coronary artery was selectively injected. The proximal segment is normal. The mid segment is normal. The distal segment is normal. There is an ostial 20 to 30% narrowing at the origin of the large first marginal branch.   Right Coronary Artery   The dominant right coronary was selectively injected. The proximal segment has no significant narrowing. The mid segment has a 20 to 30% eccentric narrowing. The distal segment just before the bifurcation has a smooth eccentric 60% narrowing. The posterior descending branch is large. The posterior lateral branch is very small caliber. IFR in the distal right coronary was 0.99 indicating the distal lesion was not hemodynamically significant.   Dist RCA lesion is 60% stenosed.  Not the culprit lesion. The lesion is type B1 - medium risk and eccentric. Pressure wire/iFR used. 0.99   Intervention     1st Diag lesion   Stent   CATH LAUNCHER 6FR EBU 30 [id number] guide catheter was successful. The guidewire guidewire crosses lesion The pre-interventional distal flow is normal (EMANUEL 3). A STENT SYNERGY DRUG ELUTING 2.84S10GK [id number] drug eluting stent was successfully placed. Pre-stent angioplasty was performed using a CATH BALLOON NC EMERGE 2.25X6MM [id number] supply. Maximum pressure: 6 nayely. Inflation time: 25 sec. . Post-stent angioplasty was performed using a CATH BALLOON NC EMERGE 2.25X6MM [id number] supply. Maximum pressure: 16 nayely. Inflation time: 15 sec. . The post-interventional distal flow is normal (EMANUEL 3). The intervention was successful. No complications occurred at this lesion.   There is a 0% residual stenosis post intervention.       Disclaimer: This note consists of symbols derived from keyboarding, dictation and/or voice recognition software. As a result, there may be errors in the script that have gone undetected. Please consider this when interpreting information found in this chart.

## 2019-08-12 NOTE — PROGRESS NOTES
Infection Prevention:    Patient requires Contact precautions because of MRSA: forehead and neck [phone]. Request to be cleared: patient has one negative nares culture. Does not currently meet protocol to be cleared. 3 negative nares cultures are required per policy. Please contact Infection Prevention with any further questions/concerns at *[phone].    [facility] ICP

## 2019-08-12 NOTE — PROGRESS NOTES
[facility]    Cardiology Progress Note    Date of Service (when I saw the patient): 08/12/2019     Assessment & Plan   Alex Alvares is a 53 year old male with remote h/o tobacco use, mild dyslipidemia (not previously dx) and family h/o CAD who was admitted on 8/10/2019 after he had abrupt onset of severe chest pain that also radiated to his left shoulder and down his left arm. Trop mildly elevated, peaked at 1.7. Being treated for a NSTEMI. TTE showed preserved EF, no WMA, no valve dz.     1. NSTEMI  -IV heparin, aspirin, high intensity statin, beta-blocker-not sure why pt was receiving both metoprolol and coreg yesterday, d/c'd metoprolol this AM. Titrate coreg as needed.   -Cardiac catheterization with possible PCI today. We reviewed risks and benefits.  -No contraindications for DENILSON if needed.    2. Mild dyslipidemia  -statin started this stay    Mariel Amor PA-C    Interval History   No CP or RILEY this AM. No other concerns.     Physical Exam   Temp: 96.5  F (35.8  C) Temp src: Oral BP: 124/64 Pulse: 78 Heart Rate: 78 Resp: 16 SpO2: 98 % O2 Device: None (Room air)    Vitals:    08/10/19 0343 08/10/19 0526   Weight: 84.4 kg (186 lb 1.1 oz) 83.8 kg (184 lb 12.8 oz)     Vital Signs with Ranges  Temp:  [96.3  F (35.7  C)-98  F (36.7  C)] 96.5  F (35.8  C)  Pulse:  [78] 78  Heart Rate:  [78-86] 78  Resp:  [16-18] 16  BP: (124-135)/(62-73) 124/64  SpO2:  [97 %-99 %] 98 %  I/O last 3 completed shifts:  In: 6 [I.V.:6]  Out: -     Constitutional     alert and oriented, in no acute distress.     Skin     warm and dry to touch    Lungs  clear to auscultation     Cardiac  regular rhythm, S1 normal, S2 normal, No S3 or S4, no murmurs, no rubs    Extremities and Back     No edema observed.        Neurological     no gross motor deficits noted, affect appropriate, oriented to time, person and place.      Medications     HEParin 950 Units/hr (08/12/19 8509)     - MEDICATION INSTRUCTIONS -       sodium  chloride 100 mL/hr at 08/12/19 0625       [START ON 8/13/2019] aspirin  81 mg Oral Daily     aspirin  325 mg Oral Once     atorvastatin  80 mg Oral QPM     carvedilol  3.125 mg Oral BID w/meals     metoprolol tartrate  12.5 mg Oral BID     sodium chloride (PF)  3 mL Intracatheter Q8H       Data   Most Recent 3 CBC's:  Recent Labs   Lab Test 08/10/19  0634 08/10/19  0029   WBC 9.3 8.9   HGB 16.4 16.9   MCV 85 85    260     Most Recent 3 BMP's:  Recent Labs   Lab Test 08/12/19  0713 08/11/19  0601 08/10/19  0029   NA  --  140 137   POTASSIUM 3.9 4.0 3.7   CHLORIDE  --  110* 102   CO2  --  27 32   BUN  --  17 17   CR  --  0.82 1.07   ANIONGAP  --  3 3   FLOYD  --  8.2* 9.1   GLC  --  122* 101*     Most Recent 2 LFT's:  Recent Labs   Lab Test 08/10/19  0029   AST 29   ALT 59   ALKPHOS 101   BILITOTAL 0.5     Most Recent 3 Troponin's:  Recent Labs   Lab Test 08/11/19  0601 08/11/19  0155 08/10/19  2204   TROPI 1.116* 1.185* 1.184*     Most Recent Cholesterol Panel:  Recent Labs   Lab Test 08/11/19  0601   CHOL 199   *   HDL 36*   TRIG 168*     Most Recent Hemoglobin A1c:  Recent Labs   Lab Test 08/11/19  0601   A1C 5.6

## 2019-08-12 NOTE — PLAN OF CARE
Pt  admitted NSTEMI. VSS. Denies any chest pain or discomfort at this time. Hep gtt maintained, IVF. Pt NPO for cath lab today. Tele SR. Iso maintained. Anticipate 1-2 day until discharge following cath lab. Will continue to monitor.

## 2019-08-12 NOTE — PLAN OF CARE
Pt discharged to home at this time, Had angio with stent placement , TR band off and pt diego well. Discharge papers signed, and pt and spouse verbalize understanding.

## 2019-08-12 NOTE — PROGRESS NOTES
[facility]    Hospitalist Progress Note    Date of Service (when I saw the patient): 08/12/2019  Provider:  Baljeet Mauro MD   Text Page  7am - 6PM       Assessment & Plan   Alex Alvares is a 53-year-old man who was brought to the emergency department by his wife after he had abrupt onset of severe chest pain that also radiated to his left shoulder and down his left arm.  He was admitted for NSTEMI.  He remains chest pain-free.  Troponins are trending down.  On heparin infusion.  Plan for cardiac cath today.     Plan:     Non-ST elevation acute coronary syndrome  -IV heparin, aspirin, high intensity statin, beta-blocker.  - Echo done.  - Cardiology consult appreciated.  - Cardiac cath today  - hemoglobin A1c 5.6.     Dyslipidemia  - .  HDL 36.  - Discussed lifestyle modification.  He was visited by nutritionist.  -On high intensity statin.  Outpatient monitoring.       DVT Prophylaxis: Heparin gtt  Code Status: Full Code    Disposition: Expected discharge once angiogram done, treatment defined.    Interval History   Asymptomatic overnight, no concerns now.     -Data reviewed today: I reviewed all new labs and imaging results over the last 24 hours. I personally reviewed the EKG tracing showing NSR .    Physical Exam   Temp: 96.5  F (35.8  C) Temp src: Oral BP: 126/81 Pulse: 78 Heart Rate: 71 Resp: 16 SpO2: 98 % O2 Device: None (Room air)    Vitals:    08/10/19 0343 08/10/19 0526   Weight: 84.4 kg (186 lb 1.1 oz) 83.8 kg (184 lb 12.8 oz)     Vital Signs with Ranges  Temp:  [96.3  F (35.7  C)-98  F (36.7  C)] 96.5  F (35.8  C)  Pulse:  [78] 78  Heart Rate:  [71-86] 71  Resp:  [16-18] 16  BP: (124-135)/(62-81) 126/81  SpO2:  [97 %-99 %] 98 %  I/O last 3 completed shifts:  In: 6 [I.V.:6]  Out: -     GEN:  Alert, oriented x 3, appears comfortable, NAD.  HEENT:  Normocephalic/atraumatic, no scleral icterus, no nasal discharge, mouth moist.  CV:  Regular rate and rhythm, no murmur or JVD.  S1 + S2  noted, no S3 or S4.  LUNGS:  Clear to auscultation bilaterally without rales/rhonchi/wheezing/retractions.  Symmetric chest rise on inhalation noted.  ABD:  Active bowel sounds, soft, non-tender/non-distended.  No rebound/guarding/rigidity.  EXT:  No edema or cyanosis.  No joint synovitis noted.  SKIN:  Dry to touch, no exanthems noted in the visualized areas.       Medications     HEParin 950 Units/hr (08/12/19 0863)     - MEDICATION INSTRUCTIONS -       sodium chloride 100 mL/hr at 08/12/19 0625       [START ON 8/13/2019] aspirin  81 mg Oral Daily     atorvastatin  80 mg Oral QPM     carvedilol  3.125 mg Oral BID w/meals     sodium chloride (PF)  3 mL Intracatheter Q8H       Data   Recent Labs   Lab 08/12/19  0713 08/11/19  0601 08/11/19  0155 08/10/19  2204  08/10/19  0634  08/10/19  0029   WBC  --   --   --   --   --  9.3  --  8.9   HGB  --   --   --   --   --  16.4  --  16.9   MCV  --   --   --   --   --  85  --  85   PLT  --   --   --   --   --  241  --  260   NA  --  140  --   --   --   --   --  137   POTASSIUM 3.9 4.0  --   --   --   --   --  3.7   CHLORIDE  --  110*  --   --   --   --   --  102   CO2  --  27  --   --   --   --   --  32   BUN  --  17  --   --   --   --   --  17   CR  --  0.82  --   --   --   --   --  1.07   ANIONGAP  --  3  --   --   --   --   --  3   FLOYD  --  8.2*  --   --   --   --   --  9.1   GLC  --  122*  --   --   --   --   --  101*   ALBUMIN  --   --   --   --   --   --   --  4.1   PROTTOTAL  --   --   --   --   --   --   --  8.8   BILITOTAL  --   --   --   --   --   --   --  0.5   ALKPHOS  --   --   --   --   --   --   --  101   ALT  --   --   --   --   --   --   --  59   AST  --   --   --   --   --   --   --  29   LIPASE  --   --   --   --   --   --   --  409*   TROPI  --  1.116* 1.185* 1.184*   < > 1.502*   < > 0.035    < > = values in this interval not displayed.       No results found for this or any previous visit (from the past 24 hour(s)).          Disclaimer: This note  consists of symbols derived from keyboarding, dictation and/or voice recognition software. As a result, there may be errors in the script that have gone undetected. Please consider this when interpreting information found in this chart.

## 2019-08-12 NOTE — PRE-PROCEDURE
GENERAL PRE-PROCEDURE:   Procedure:  Coronary angiogram possible PCI    Verbal consent obtained?: Yes    Written consent obtained?: No    Risks and benefits: Risks, benefits and alternatives were discussed    DC Plan: Appropriate discharge home plan in place for patients who are going home after procedure   Consent given by:  Patient  Patient states understanding of procedure being performed: Yes    Patient's understanding of procedure matches consent: Yes    Procedure consent matches procedure scheduled: Yes    Expected level of sedation:  Moderate  Appropriately NPO:  Yes  ASA Class:  Class 3- Severe systemic disease, definite functional limitations  Mallampati  :  Grade 1- soft palate, uvula, tonsillar pillars, and posterior pharyngeal wall visible  Lungs:  Lungs clear with good breath sounds bilaterally  Heart:  Normal heart sounds and rate  History & Physical reviewed:  History and physical reviewed and no updates needed

## 2019-08-13 ENCOUNTER — TELEPHONE (OUTPATIENT)
Dept: CARDIOLOGY | Facility: CLINIC | Age: 54
End: 2019-08-13

## 2019-08-13 DIAGNOSIS — I25.10 CAD (CORONARY ARTERY DISEASE): Primary | ICD-10-CM

## 2019-08-13 LAB — INTERPRETATION ECG - MUSE: NORMAL

## 2019-08-13 RX ORDER — NITROGLYCERIN 0.4 MG/1
TABLET SUBLINGUAL
Qty: 30 TABLET | Refills: 1 | Status: SHIPPED | OUTPATIENT
Start: 2019-08-13

## 2019-08-13 NOTE — TELEPHONE ENCOUNTER
Patient was evaluated by cardiology while inpatient for abrupt onset of chest pain. 8/12/19 PCI via RRA with DENILSON to prox 1st diagonal with moderate residual dRCA to be medically managed. Called patient to discuss any post hospital d/c questions, review medication changes, and confirm f/u appts. RN confirmed with patient that he was d/c with the antiplatelet Brilinta. Pt does not have an Rx for PRN SL Nitroglycerin. Will route this note to KELLEE Wilkerson to address, and will then call pt. RN will confirmed with patient that he needs to schedule f/u cardiology KELLEE OV with labs prior as ordered. Cardiac rehab is scheduled on 8/22/19. JAX Gorman RN.

## 2019-08-13 NOTE — TELEPHONE ENCOUNTER
Pt returned writer's phone call and denies chest pain, SOB or lightheadedness. RFA access site is without signs of infection or swelling, drainage. Denies fever. Writer instructed pt on PRN SL Nitroglycerin, including indications, storage and expiration period once bottle opened, administration, expected side effects and when to call the clinic vs 911. Pt verbalized understanding and RX escribed to pt's [facility] pharmacy per his request. Denies other medication questions. Has cardiology f/u scheduled at his [facility] provider. Reminded of cardiac rehab as below. No further questions. JAX Gorman RN.

## 2019-08-13 NOTE — PLAN OF CARE
VSS, A/O. LS-clear. Tele. SR.  Pt denied CP/SOB. Hep gtt infusing prior to angio today. Pt had stent placed, brought back to floor to recover TR band.  VSS upon arriving to floor. CMS intact.  Wife at bedside.

## 2019-08-13 NOTE — TELEPHONE ENCOUNTER
"Writer attempted to call pt, but no answer. VM left to return my phone call. JAX Gorman RN.    Per Mariel Amor's response: \"Yes please! Thanks for catching it\"      Chris Floyd,   I noticed during discharge care coordination chart review that pt was not discharged to home with PRN SL NTG. Did you want this ordered. Let me know.   Thanks,   Eugenia Gorman RN         "

## 2019-09-05 ENCOUNTER — HOSPITAL ENCOUNTER (OUTPATIENT)
Dept: CARDIAC REHAB | Facility: CLINIC | Age: 54
End: 2019-09-05
Attending: INTERNAL MEDICINE
Payer: COMMERCIAL

## 2019-09-05 DIAGNOSIS — I21.4 NSTEMI (NON-ST ELEVATED MYOCARDIAL INFARCTION) (H): ICD-10-CM

## 2019-09-05 PROCEDURE — 93797 PHYS/QHP OP CAR RHAB WO ECG: CPT | Mod: 59 | Performed by: OCCUPATIONAL THERAPIST

## 2019-09-05 PROCEDURE — 93798 PHYS/QHP OP CAR RHAB W/ECG: CPT | Performed by: OCCUPATIONAL THERAPIST

## 2019-09-05 PROCEDURE — 40000116 ZZH STATISTIC OP CR VISIT: Performed by: OCCUPATIONAL THERAPIST

## 2019-09-05 PROCEDURE — 40000575 ZZH STATISTIC OP CARDIAC VISIT #2: Performed by: OCCUPATIONAL THERAPIST

## 2019-09-09 ENCOUNTER — HOSPITAL ENCOUNTER (OUTPATIENT)
Dept: CARDIAC REHAB | Facility: CLINIC | Age: 54
End: 2019-09-09
Attending: INTERNAL MEDICINE
Payer: COMMERCIAL

## 2019-09-09 PROCEDURE — 40000116 ZZH STATISTIC OP CR VISIT: Performed by: OCCUPATIONAL THERAPIST

## 2019-09-09 PROCEDURE — 93798 PHYS/QHP OP CAR RHAB W/ECG: CPT | Performed by: OCCUPATIONAL THERAPIST

## 2019-09-13 ENCOUNTER — HOSPITAL ENCOUNTER (OUTPATIENT)
Dept: CARDIAC REHAB | Facility: CLINIC | Age: 54
End: 2019-09-13
Attending: INTERNAL MEDICINE
Payer: COMMERCIAL

## 2019-09-13 PROCEDURE — 93798 PHYS/QHP OP CAR RHAB W/ECG: CPT | Performed by: OCCUPATIONAL THERAPIST

## 2019-09-13 PROCEDURE — 40000116 ZZH STATISTIC OP CR VISIT: Performed by: OCCUPATIONAL THERAPIST

## 2019-09-18 ENCOUNTER — HOSPITAL ENCOUNTER (OUTPATIENT)
Dept: CARDIAC REHAB | Facility: CLINIC | Age: 54
End: 2019-09-18
Attending: INTERNAL MEDICINE
Payer: COMMERCIAL

## 2019-09-18 PROCEDURE — 40000116 ZZH STATISTIC OP CR VISIT: Performed by: REHABILITATION PRACTITIONER

## 2019-09-18 PROCEDURE — 93798 PHYS/QHP OP CAR RHAB W/ECG: CPT | Performed by: REHABILITATION PRACTITIONER

## 2019-09-20 ENCOUNTER — HOSPITAL ENCOUNTER (OUTPATIENT)
Dept: CARDIAC REHAB | Facility: CLINIC | Age: 54
End: 2019-09-20
Attending: INTERNAL MEDICINE
Payer: COMMERCIAL

## 2019-09-20 PROCEDURE — 93798 PHYS/QHP OP CAR RHAB W/ECG: CPT | Performed by: OCCUPATIONAL THERAPIST

## 2019-09-20 PROCEDURE — 40000116 ZZH STATISTIC OP CR VISIT: Performed by: OCCUPATIONAL THERAPIST

## 2019-09-23 ENCOUNTER — HOSPITAL ENCOUNTER (OUTPATIENT)
Dept: CARDIAC REHAB | Facility: CLINIC | Age: 54
End: 2019-09-23
Attending: INTERNAL MEDICINE
Payer: COMMERCIAL

## 2019-09-23 PROCEDURE — 40000116 ZZH STATISTIC OP CR VISIT

## 2019-09-23 PROCEDURE — 93798 PHYS/QHP OP CAR RHAB W/ECG: CPT

## 2019-09-27 ENCOUNTER — HOSPITAL ENCOUNTER (OUTPATIENT)
Dept: CARDIAC REHAB | Facility: CLINIC | Age: 54
End: 2019-09-27
Attending: INTERNAL MEDICINE
Payer: COMMERCIAL

## 2019-09-27 PROCEDURE — 93798 PHYS/QHP OP CAR RHAB W/ECG: CPT | Performed by: REHABILITATION PRACTITIONER

## 2019-09-27 PROCEDURE — 40000116 ZZH STATISTIC OP CR VISIT: Performed by: REHABILITATION PRACTITIONER

## 2019-10-02 ENCOUNTER — HOSPITAL ENCOUNTER (OUTPATIENT)
Dept: CARDIAC REHAB | Facility: CLINIC | Age: 54
End: 2019-10-02
Attending: INTERNAL MEDICINE
Payer: COMMERCIAL

## 2019-10-02 PROCEDURE — 40000116 ZZH STATISTIC OP CR VISIT

## 2019-10-02 PROCEDURE — 93798 PHYS/QHP OP CAR RHAB W/ECG: CPT

## 2019-10-04 ENCOUNTER — HOSPITAL ENCOUNTER (OUTPATIENT)
Dept: CARDIAC REHAB | Facility: CLINIC | Age: 54
End: 2019-10-04
Attending: INTERNAL MEDICINE
Payer: COMMERCIAL

## 2019-10-04 PROCEDURE — 40000116 ZZH STATISTIC OP CR VISIT: Performed by: OCCUPATIONAL THERAPIST

## 2019-10-04 PROCEDURE — 93798 PHYS/QHP OP CAR RHAB W/ECG: CPT | Performed by: OCCUPATIONAL THERAPIST

## 2019-10-07 ENCOUNTER — HOSPITAL ENCOUNTER (OUTPATIENT)
Dept: CARDIAC REHAB | Facility: CLINIC | Age: 54
End: 2019-10-07
Attending: INTERNAL MEDICINE
Payer: COMMERCIAL

## 2019-10-07 PROCEDURE — 40000116 ZZH STATISTIC OP CR VISIT

## 2019-10-07 PROCEDURE — 93798 PHYS/QHP OP CAR RHAB W/ECG: CPT

## 2019-10-14 ENCOUNTER — HOSPITAL ENCOUNTER (OUTPATIENT)
Dept: CARDIAC REHAB | Facility: CLINIC | Age: 54
End: 2019-10-14
Attending: INTERNAL MEDICINE
Payer: COMMERCIAL

## 2019-10-14 PROCEDURE — 93798 PHYS/QHP OP CAR RHAB W/ECG: CPT

## 2019-10-14 PROCEDURE — 40000116 ZZH STATISTIC OP CR VISIT

## 2019-10-16 ENCOUNTER — HOSPITAL ENCOUNTER (OUTPATIENT)
Dept: CARDIAC REHAB | Facility: CLINIC | Age: 54
End: 2019-10-16
Attending: INTERNAL MEDICINE
Payer: COMMERCIAL

## 2019-10-16 PROCEDURE — 93798 PHYS/QHP OP CAR RHAB W/ECG: CPT | Performed by: REHABILITATION PRACTITIONER

## 2019-10-16 PROCEDURE — 40000116 ZZH STATISTIC OP CR VISIT: Performed by: REHABILITATION PRACTITIONER

## 2019-11-11 NOTE — PROGRESS NOTES
Cardiac Rehab Discharge Summary    Reason for discharge:    Patient/family request discontinuation of services.    Progress towards goals:  Goals met    Recommendation(s):    Continue home exercise program.    Physician cosignature/electronic signature indicates agreements with the ITP document and approval of discharge.

## 2021-01-15 ENCOUNTER — OFFICE VISIT (OUTPATIENT)
Dept: INTERNAL MEDICINE | Facility: CLINIC | Age: 56
End: 2021-01-15
Payer: COMMERCIAL

## 2021-01-15 VITALS
OXYGEN SATURATION: 98 % | SYSTOLIC BLOOD PRESSURE: 115 MMHG | DIASTOLIC BLOOD PRESSURE: 70 MMHG | HEART RATE: 80 BPM | WEIGHT: 196 LBS | HEIGHT: 71 IN | TEMPERATURE: 98.2 F | BODY MASS INDEX: 27.44 KG/M2

## 2021-01-15 DIAGNOSIS — I21.4 NSTEMI (NON-ST ELEVATED MYOCARDIAL INFARCTION) (H): ICD-10-CM

## 2021-01-15 DIAGNOSIS — Z12.5 SCREENING FOR PROSTATE CANCER: ICD-10-CM

## 2021-01-15 DIAGNOSIS — Z12.11 COLON CANCER SCREENING: ICD-10-CM

## 2021-01-15 DIAGNOSIS — N50.89 TESTICULAR NODULE: ICD-10-CM

## 2021-01-15 DIAGNOSIS — N52.2 DRUG-INDUCED ERECTILE DYSFUNCTION: Primary | ICD-10-CM

## 2021-01-15 LAB
PSA SERPL-ACNC: 0.64 UG/L (ref 0–4)
TSH SERPL DL<=0.005 MIU/L-ACNC: 1 MU/L (ref 0.4–4)

## 2021-01-15 PROCEDURE — 99000 SPECIMEN HANDLING OFFICE-LAB: CPT | Performed by: INTERNAL MEDICINE

## 2021-01-15 PROCEDURE — 36415 COLL VENOUS BLD VENIPUNCTURE: CPT | Performed by: INTERNAL MEDICINE

## 2021-01-15 PROCEDURE — 99203 OFFICE O/P NEW LOW 30 MIN: CPT | Performed by: INTERNAL MEDICINE

## 2021-01-15 PROCEDURE — 84403 ASSAY OF TOTAL TESTOSTERONE: CPT | Mod: 90 | Performed by: INTERNAL MEDICINE

## 2021-01-15 PROCEDURE — 84270 ASSAY OF SEX HORMONE GLOBUL: CPT | Performed by: INTERNAL MEDICINE

## 2021-01-15 PROCEDURE — G0103 PSA SCREENING: HCPCS | Performed by: INTERNAL MEDICINE

## 2021-01-15 PROCEDURE — 84443 ASSAY THYROID STIM HORMONE: CPT | Performed by: INTERNAL MEDICINE

## 2021-01-15 RX ORDER — TADALAFIL 10 MG/1
10 TABLET ORAL DAILY PRN
Qty: 12 TABLET | Refills: 3 | Status: SHIPPED | OUTPATIENT
Start: 2021-01-15

## 2021-01-15 ASSESSMENT — MIFFLIN-ST. JEOR: SCORE: 1751.18

## 2021-01-15 NOTE — PROGRESS NOTES
"  Assessment & Plan     NSTEMI (non-ST elevated myocardial infarction) (H)    - aspirin (ASA) 81 MG EC tablet; Take 2 tablets (162 mg) by mouth daily  - TSH with free T4 reflex    Screening for prostate cancer    - Prostate spec antigen screen    Colon cancer screening    - GASTROENTEROLOGY ADULT REF PROCEDURE ONLY; Future    Drug-induced erectile dysfunction  Assess lab work   Start treatment, advised for side effects   - Testosterone Free and Total  - tadalafil (CIALIS) 10 MG tablet; Take 1 tablet (10 mg) by mouth daily as needed    Testicular nodule    - US Testicular & Scrotum w Doppler Ltd; Future  [id number]}     BMI:   Estimated body mass index is 27.34 kg/m  as calculated from the following:    Height as of this encounter: 1.803 m (5' 11\").    Weight as of this encounter: 88.9 kg (196 lb).         See Patient Instructions    Return in about 1 year (around 1/15/2022) for Physical Exam.    Miles Smart MD  [facility] is a 55 year old who presents to clinic today for the following health issues     HPI   Chief Complaint   Patient presents with     Erectile Dysfunction     Discuss ED and Rx            New Patient/Transfer of Care  Concern for ED for about a year.   Had a heart attack a year ago, needed stent placed and was started on medications for HNT and cholesterol.   Since then has symptoms of weaker and shorter lasting erections.   BP has been controlled. No chest pain or SOB.   No back pain or injury. No weight changes. Physically active.   Work is stressful, works as a  in a restaurant.     Review of Systems   Constitutional, HEENT, cardiovascular, pulmonary, gi and gu systems are negative, except as otherwise noted.      Objective    /70 (BP Location: Left arm, Patient Position: Sitting, Cuff Size: Adult Large)   Pulse 80   Temp 98.2  F (36.8  C) (Oral)   Ht 1.803 m (5' 11\")   Wt 88.9 kg (196 lb)   SpO2 98%   BMI 27.34 kg/m    Body " mass index is 27.34 kg/m .  Physical Exam   GENERAL: healthy, alert and no distress  NECK: no adenopathy, no asymmetry, masses, or scars and thyroid normal to palpation  RESP: lungs clear to auscultation - no rales, rhonchi or wheezes  CV: regular rate and rhythm, normal S1 S2, no S3 or S4, no murmur, click or rub, no peripheral edema and peripheral pulses strong  ABDOMEN: soft, nontender, no hepatosplenomegaly, no masses and bowel sounds normal   (male): normal male genitalia without lesions or urethral discharge, no hernia, left testicle small superficial lump 4-5 mm on the upper medial surface   MS: no gross musculoskeletal defects noted, no edema  SKIN: no suspicious lesions or rashes

## 2021-01-15 NOTE — LETTER
[facility]  [address], [address]  [place] [address]  [phone]        January 19, 2021    Alex Alvares  [address] [place] [id number]            Dear Mr. Alex Alvares:      The results of your recent labs were NORMAL.  If you have any further questions or problems, please contact our office.    Sincerely,        Miles Smart M.D.

## 2021-01-18 LAB
SHBG SERPL-SCNC: 45 NMOL/L (ref 11–80)
TESTOST FREE SERPL-MCNC: 6.81 NG/DL (ref 4.7–24.4)
TESTOST SERPL-MCNC: 408 NG/DL (ref 240–950)

## 2021-01-20 ENCOUNTER — HOSPITAL ENCOUNTER (OUTPATIENT)
Dept: ULTRASOUND IMAGING | Facility: CLINIC | Age: 56
Discharge: HOME | End: 2021-01-20
Attending: INTERNAL MEDICINE | Admitting: INTERNAL MEDICINE
Payer: COMMERCIAL

## 2021-01-20 DIAGNOSIS — N50.89 TESTICULAR NODULE: ICD-10-CM

## 2021-01-20 PROCEDURE — 76870 US EXAM SCROTUM: CPT

## 2021-01-20 PROCEDURE — 93976 VASCULAR STUDY: CPT

## 2021-01-31 DIAGNOSIS — Z11.59 ENCOUNTER FOR SCREENING FOR OTHER VIRAL DISEASES: Primary | ICD-10-CM

## 2021-02-12 DIAGNOSIS — Z11.59 ENCOUNTER FOR SCREENING FOR OTHER VIRAL DISEASES: ICD-10-CM

## 2021-02-12 LAB
LABORATORY COMMENT REPORT: NORMAL
SARS-COV-2 RNA RESP QL NAA+PROBE: NEGATIVE
SARS-COV-2 RNA RESP QL NAA+PROBE: NORMAL
SPECIMEN SOURCE: NORMAL
SPECIMEN SOURCE: NORMAL

## 2021-02-12 PROCEDURE — U0005 INFEC AGEN DETEC AMPLI PROBE: HCPCS | Performed by: INTERNAL MEDICINE

## 2021-02-12 PROCEDURE — U0003 INFECTIOUS AGENT DETECTION BY NUCLEIC ACID (DNA OR RNA); SEVERE ACUTE RESPIRATORY SYNDROME CORONAVIRUS 2 (SARS-COV-2) (CORONAVIRUS DISEASE [COVID-19]), AMPLIFIED PROBE TECHNIQUE, MAKING USE OF HIGH THROUGHPUT TECHNOLOGIES AS DESCRIBED BY CMS-2020-01-R: HCPCS | Performed by: INTERNAL MEDICINE

## 2021-02-16 ENCOUNTER — HOSPITAL ENCOUNTER (OUTPATIENT)
Facility: CLINIC | Age: 56
Discharge: HOME | End: 2021-02-16
Attending: INTERNAL MEDICINE | Admitting: INTERNAL MEDICINE
Payer: COMMERCIAL

## 2021-02-16 VITALS
HEART RATE: 75 BPM | RESPIRATION RATE: 14 BRPM | DIASTOLIC BLOOD PRESSURE: 84 MMHG | SYSTOLIC BLOOD PRESSURE: 126 MMHG | OXYGEN SATURATION: 95 % | TEMPERATURE: 97.6 F

## 2021-02-16 LAB — COLONOSCOPY: NORMAL

## 2021-02-16 PROCEDURE — 88305 TISSUE EXAM BY PATHOLOGIST: CPT | Mod: TC | Performed by: INTERNAL MEDICINE

## 2021-02-16 PROCEDURE — 88305 TISSUE EXAM BY PATHOLOGIST: CPT | Mod: 26

## 2021-02-16 PROCEDURE — 250N000011 HC RX IP 250 OP 636: Performed by: INTERNAL MEDICINE

## 2021-02-16 PROCEDURE — 45385 COLONOSCOPY W/LESION REMOVAL: CPT | Mod: PT | Performed by: INTERNAL MEDICINE

## 2021-02-16 PROCEDURE — G0500 MOD SEDAT ENDO SERVICE >5YRS: HCPCS | Performed by: INTERNAL MEDICINE

## 2021-02-16 RX ORDER — ONDANSETRON 2 MG/ML
4 INJECTION INTRAMUSCULAR; INTRAVENOUS EVERY 6 HOURS PRN
Status: DISCONTINUED | OUTPATIENT
Start: 2021-02-16 | End: 2021-02-16 | Stop reason: HOSPADM

## 2021-02-16 RX ORDER — FENTANYL CITRATE 50 UG/ML
INJECTION, SOLUTION INTRAMUSCULAR; INTRAVENOUS PRN
Status: DISCONTINUED | OUTPATIENT
Start: 2021-02-16 | End: 2021-02-16 | Stop reason: HOSPADM

## 2021-02-16 RX ORDER — NALOXONE HYDROCHLORIDE 0.4 MG/ML
0.4 INJECTION, SOLUTION INTRAMUSCULAR; INTRAVENOUS; SUBCUTANEOUS
Status: DISCONTINUED | OUTPATIENT
Start: 2021-02-16 | End: 2021-02-16 | Stop reason: HOSPADM

## 2021-02-16 RX ORDER — LIDOCAINE 40 MG/G
CREAM TOPICAL
Status: DISCONTINUED | OUTPATIENT
Start: 2021-02-16 | End: 2021-02-16 | Stop reason: HOSPADM

## 2021-02-16 RX ORDER — NALOXONE HYDROCHLORIDE 0.4 MG/ML
0.2 INJECTION, SOLUTION INTRAMUSCULAR; INTRAVENOUS; SUBCUTANEOUS
Status: DISCONTINUED | OUTPATIENT
Start: 2021-02-16 | End: 2021-02-16 | Stop reason: HOSPADM

## 2021-02-16 RX ORDER — PROCHLORPERAZINE MALEATE 10 MG
10 TABLET ORAL EVERY 6 HOURS PRN
Status: DISCONTINUED | OUTPATIENT
Start: 2021-02-16 | End: 2021-02-16 | Stop reason: HOSPADM

## 2021-02-16 RX ORDER — FLUMAZENIL 0.1 MG/ML
0.2 INJECTION, SOLUTION INTRAVENOUS
Status: DISCONTINUED | OUTPATIENT
Start: 2021-02-16 | End: 2021-02-16 | Stop reason: HOSPADM

## 2021-02-16 RX ORDER — ONDANSETRON 4 MG/1
4 TABLET, ORALLY DISINTEGRATING ORAL EVERY 6 HOURS PRN
Status: DISCONTINUED | OUTPATIENT
Start: 2021-02-16 | End: 2021-02-16 | Stop reason: HOSPADM

## 2021-02-16 RX ORDER — ONDANSETRON 2 MG/ML
4 INJECTION INTRAMUSCULAR; INTRAVENOUS
Status: DISCONTINUED | OUTPATIENT
Start: 2021-02-16 | End: 2021-02-16 | Stop reason: HOSPADM

## 2021-02-16 NOTE — DISCHARGE INSTRUCTIONS
The patient has received a copy of the Provation  report the doctor has written and discharge instructions have been discussed with the patient and responsible adult.  All questions were addressed and answered prior to patient discharge.      Understanding Colon and Rectal Polyps     The colon has a smooth lining composed of millions of cells.     The colon (also called the large intestine) is a muscular tube that forms the last part of the digestive tract. It absorbs water and stores food waste. The colon is about 4 to 6 feet long. The rectum is the last 6 inches of the colon. The colon and rectum have a smooth lining composed of millions of cells. Changes in these cells can lead to growths in the colon that can become cancerous and should be removed.     When the Colon Lining Changes  Changes that occur in the cells that line the colon or rectum can lead to growths called polyps. Over a period of years, polyps can turn cancerous. Removing polyps early may prevent cancer from ever forming.      Polyps  Polyps are fleshy clumps of tissue that form on the lining of the colon or rectum. Small polyps are usually benign (not cancerous). However, over time, cells in a polyp can change and become cancerous. The larger a polyp grows, the more likely this is to happen. Also, certain types of polyps known as adenomatous polyps are considered premalignant. This means that they will almost always become cancerous if they re not removed.          Cancer  Almost all colorectal cancers start when polyp cells begin growing abnormally. As a cancerous tumor grows, it may involve more and more of the colon or rectum. In time, cancer can also grow beyond the colon or rectum and spread to nearby organs or to glands called lymph nodes. The cells can also travel to other parts of the body. This is known as metastasis. The earlier a cancerous tumor is removed, the better the chance of preventing its spread.        3411-4674 Gino  [employer], [address] [place] [address], [place] [address]. All rights reserved. This information is not intended as a substitute for professional medical care. Always follow your healthcare professional's instructions.

## 2021-02-16 NOTE — LETTER
January 20, 2021      Alex FELIZ Dia  [address] [place] [id number]        Dear Alex,     Please be aware that coverage of these services is subject to the terms and limitations of your health insurance plan.  Call member services at your health plan with any benefit or coverage questions.    Thank you for choosing [facility]. You are scheduled for the following service(s):    Date:  2/15/2021  Monday             Procedure:  COLONOSCOPY  Doctor:        Dr. Dey   Arrival Time:  9:00 am *Enter and check in at the Main Hospital Entrance*  Procedure Time:  9:30 am      Location:   [facility]        Endoscopy Department, First Floor         [address] [place], [place] [address]      [phone] or [phone] ([facility]) to reschedule        MIRALAX -GATORADE  PREP  Colonoscopy is the most accurate test to detect colon polyps and colon cancer; and the only test where polyps can be removed. During this procedure, a doctor examines the lining of your large intestine and rectum through a flexible tube.   Transportation  You must arrange for a ride for the day of your procedure with a responsible adult. A taxi , Uber, etc, is not an option unless you are accompanied by a responsible adult. If you fail to arrange transportation with a responsible adult, your procedure will be cancelled and rescheduled.    Purchase the  following supplies at your local pharmacy:  - 2 (two) bisacodyl tablets: each tablet contains 5 mg.  (Dulcolax  laxative NOT Dulcolax  stool softener)   - 1 (one) 8.3 oz bottle of Polyethylene Glycol (PEG) 3350 Powder   (MiraLAX , Smooth LAX , ClearLAX  or equivalent)  - 64 oz Gatorade    Regular Gatorade, Gatorade G2 , Powerade , Powerade Zero  or Pedialyte  is acceptable. Red colored flavors are not allowed; all other colors (yellow, green, orange, purple and blue) are okay. It is also okay to buy two 2.12 oz packets of powdered  Gatorade that can be mixed with water to a total volume of 64 oz of liquid.  - 1 (one) 10 oz bottle of Magnesium Citrate (Red colored flavors are not allowed)  It is also okay for you to use a 0.5 oz package of powdered magnesium citrate (17 g) mixed with 10 oz of water.      PREPARATION FOR COLONOSCOPY    7 days before:    Discontinue fiber supplements and medications containing iron. This includes Metamucil  and Fibercon ; and multivitamins with iron.    3 days before:    Begin a low-fiber diet. A low-fiber diet helps making the cleanout more effective.     Examples of a low-fiber diet include (but are not limited to): white bread, white rice, pasta, crackers, fish, chicken, eggs, ground beef, creamy peanut butter, cooked/steamed/boiled vegetables, canned fruit, bananas, melons, milk, plain yogurt cheese, salad dressing and other condiments.     The following are not allowed on a low-fiber diet: seeds, nuts, popcorn, bran, whole wheat, corn, quinoa, raw fruits and vegetables, berries and dried fruit, beans and lentils.    For additional details on low-fiber diet, please refer to the table on the last page.    2 days before:    Continue the low-fiber diet.     Drink at least 8 glasses of water throughout the day.     Stop eating solid foods at 11:45 pm.    1 day before:    In the morning: begin a clear liquid diet (liquids you can see through).     Examples of a clear liquid diet include: water, clear broth or bouillon, Gatorade, Pedialyte or Powerade, carbonated and non-carbonated soft drinks (Sprite , 7-Up , ginger ale), strained fruit juices without pulp (apple, white grape, white cranberry), Jell-O  and popsicles.     The following are not allowed on a clear liquid diet: red liquids, alcoholic beverages, dairy products (milk, creamer, and yogurt), protein shakes, creamy broths, juice with pulp and chewing tobacco.    At noon: take 2 (two) bisacodyl tablets     At 4 (and no later than 6pm): start drinking the  Miralax-Gatorade preparation (8.3 oz of Miralax mixed with 64 oz of Gatorade in a large pitcher). Drink 1(one) 8 oz glass every 15 minutes thereafter, until the mixture is gone.    COLON CLEANSING TIPS: drink adequate amounts of fluids before and after your colon cleansing to prevent dehydration. Stay near a toilet because you will have diarrhea. Even if you are sitting on the toilet, continue to drink the cleansing solution every 15 minutes. If you feel nauseous or vomit, rinse your mouth with water, take a 15 to 30-minute-break and then continue drinking the solution. You will be uncomfortable until the stool has flushed from your colon (in about 2 to 4 hours). You may feel chilled.    Day of your procedure  You may take all of your morning medications including blood pressure medications, blood thinners (if you have not been instructed to stop these by our office), methadone, anti-seizure medications with sips of water 3 hours prior to your procedure or earlier. Do not take insulin or vitamins prior to your procedure. Continue the clear liquid diet.   4 hours prior: drink 10 oz of magnesium citrate. It may be easier to drink it with a straw.    STOP consuming all liquids after that.     Do not take anything by mouth during this time.     Allow extra time to travel to your procedure as you may need to stop and use a restroom along the way.    You are ready for the procedure, if you followed all instructions and your stool is no longer formed, but clear or yellow liquid. If you are unsure whether your colon is clean, please call our office at [phone] before you leave for your appointment.    Bring the following to your procedure:  - Insurance Card/Photo ID.   - List of current medications including over-the-counter medications and supplements.   - Your rescue inhaler if you currently use one to control asthma.    Canceling or rescheduling your appointment:   If you must cancel or reschedule your appointment,  please call [phone] as soon as possible.      COLONOSCOPY PRE-PROCEDURE CHECKLIST    If you have diabetes, ask your regular doctor for diet and medication restrictions.  If you take an anticoagulant or anti-platelet medication (such as Coumadin , Lovenox , Pradaxa , Xarelto , Eliquis , etc.), please call your primary doctor for advice on holding this medication.  If you take aspirin you may continue to do so.  If you are or may be pregnant, please discuss the risks and benefits of this procedure with your doctor.        What happens during a colonoscopy?    Plan to spend up to two hours, starting at registration time, at the endoscopy center the day of your procedure. The colonoscopy takes an average of 15 to 30 minutes. Recovery time is about 30 minutes.      Before the exam:    You will change into a gown.    Your medical history and medication list will be reviewed with you, unless that has been done over the phone prior to the procedure.     A nurse will insert an intravenous (IV) line into your hand or arm.    The doctor will meet with you and will give you a consent form to sign.  During the exam:     Medicine will be given through the IV line to help you relax.     Your heart rate and oxygen levels will be monitored. If your blood pressure is low, you may be given fluids through the IV line.     The doctor will insert a flexible hollow tube, called a colonoscope, into your rectum. The scope will be advanced slowly through the large intestine (colon).    You may have a feeling of fullness or pressure.     If an abnormal tissue or a polyp is found, the doctor may remove it through the endoscope for closer examination, or biopsy. Tissue removal is painless    After the exam:           Any tissue samples removed during the exam will be sent to a lab for evaluation. It may take 5-7 working days for you to be notified of the results.     A nurse will provide you with complete discharge instructions before you  leave the endoscopy center. Be sure to ask the nurse for specific instructions if you take blood thinners such as Aspirin, Coumadin or Plavix.     The doctor will prepare a full report for you and for the physician who referred you for the procedure.     Your doctor will talk with you about the initial results of your exam.      Medication given during the exam will prohibit you from driving for the rest of the day.     Following the exam, you may resume your normal diet. Your first meal should be light, no greasy foods. Avoid alcohol until the next day.     You may resume your regular activities the day after the procedure.         LOW-FIBER DIET    Foods RECOMMENDED Foods to AVOID   Breads, Cereal, Rice and Pasta:   White bread, rolls, biscuits, croissant and tay toast.   Waffles, Panamanian toast and pancakes.   White rice, noodles, pasta, macaroni and peeled cooked potatoes.   Plain crackers and saltines.   Cooked cereals: farina, cream of rice.   Cold cereals: Puffed Rice , Rice Krispies , Corn Flakes  and Special K    Breads, Cereal, Rice and Pasta:   Breads or rolls with nuts, seeds or fruit.   Whole wheat, pumpernickel, rye breads and cornbread.   Potatoes with skin, brown or wild rice, and kasha (buckwheat).     Vegetables:   Tender cooked and canned vegetables without seeds: carrots, asparagus tips, green or wax beans, pumpkin, spinach, lima beans. Vegetables:   Raw or steamed vegetables.   Vegetables with seeds.   Sauerkraut.   Winter squash, peas, broccoli, Brussel sprouts, cabbage, onions, cauliflower, baked beans, peas and corn.   Fruits:   Strained fruit juice.   Canned fruit, except pineapple.   Ripe bananas and melon. Fruits:   Prunes and prune juice.   Raw fruits.   Dried fruits: figs, dates and raisins.   Milk/Dairy:   Milk: plain or flavored.   Yogurt, custard and ice cream.   Cheese and cottage cheese Milk/Dairy:     Meat and other proteins:   ground, well-cooked tender beef, lamb, ham, veal,  pork, fish, poultry and organ meats.   Eggs.   Peanut butter without nuts. Meat and other proteins:   Tough, fibrous meats with gristle.   Dry beans, peas and lentils.   Peanut butter with nuts.   Tofu.   Fats, Snack, Sweets, Condiments and Beverages:   Margarine, butter, oils, mayonnaise, sour cream and salad dressing, plain gravy.   Sugar, hard candy, clear jelly, honey and syrup.   Spices, cooked herbs, bouillon, broth and soups made with allowed vegetable, ketchup and mustard.   Coffee, tea and carbonated drinks.   Plain cakes, cookies and pretzels.   Gelatin, plain puddings, custard, ice cream, sherbet and popsicles. Fats, Snack, Sweets, Condiments and Beverages:   Nuts, seeds and coconut.   Jam, marmalade and preserves.   Pickles, olives, relish and horseradish.   All desserts containing nuts, seeds, dried fruit and coconut; or made from whole grains or bran.   Candy made with nuts or seeds.   Popcorn.     DIRECTIONS TO THE ENDOSCOPY DEPARTMENT    From the north ([facility])  Take [address] South, exit on [address] [address]. Get into the left hand carter, turn left (east), go one-half mile to [address] and turn left. Go north to the second stoplight, take a right on [address] and follow it to the Main Hospital entrance.    From the south ([facility])  Take 35N to the 35E split and exit on [address] [address]. On [address] [address], turn left (west) to [address]. Turn right (north) on [address]. Go north to the second stoplight, take a right on [address] and follow it to the Main Hospital entrance.    From the east via 35E ([facility])  Take [address] south to [address] [address] exit. Turn right on [address] [address]. Go west to [address]. Turn right (north) on [address]. Go to the second stoplight, take a right on [address] to the Main Hospital entrance.    From the east via [address] ([facility])  Take [address] to [address].  Turn left (south) on [address] to [address], turn left (east) on [address] and follow it to the Main Hospital entrance.    From the west via [address] ([place])  Take [address] east to [address]. Turn right (south) on [address] to [address], turn left (east) on [address] and follow it to the Main Hospital entrance.

## 2021-02-16 NOTE — H&P
Pre-Endoscopy History and Physical     Alex Alvares MRN# [id number]   YOB: 1965 Age: 55 year old     Date of Procedure: 2021  Primary care provider: Graham Brown [facility]  Type of Endoscopy: Colonoscopy with possible biopsy, possible polypectomy  Reason for Procedure: screen  Type of Anesthesia Anticipated: Conscious Sedation    HPI:    Alex is a 55 year old male who will be undergoing the above procedure.      A history and physical has been performed. The patient's medications and allergies have been reviewed. The risks and benefits of the procedure and the sedation options and risks were discussed with the patient.  All questions were answered and informed consent was obtained.      He denies a personal or family history of anesthesia complications or bleeding disorders.     Patient Active Problem List   Diagnosis     NSTEMI (non-ST elevated myocardial infarction) (H)        Past Medical History:   Diagnosis Date     Bilateral high frequency sensorineural hearing loss         Past Surgical History:   Procedure Laterality Date     CV CORONARY ANGIOGRAM N/A 2019    Procedure: Coronary Angiogram;  Surgeon: Alex Ocampo MD;  Location:  [facility]     CV INSTANTANEOUS WAVE-FREE RATIO N/A 2019    Procedure: Instantaneous Wave-Free Ratio;  Surgeon: Alex Ocampo MD;  Location: [facility] CARDIAC CATH LAB     CV PCI STENT DRUG ELUTING N/A 2019    Procedure: PCI Stent Drug Eluting;  Surgeon: Alex Ocampo MD;  Location:  [facility] CARDIAC CATH LAB     MOUTH SURGERY         Social History     Tobacco Use     Smoking status: Former Smoker     Types: Cigarettes     Quit date: 2007     Years since quittin.1     Smokeless tobacco: Never Used   Substance Use Topics     Alcohol use: Yes     Comment: occasional       Family History   Problem Relation Age of Onset     Alzheimer Disease Mother      Cancer Mother      Heart Failure Father   "    Heart Defect Father      Diabetes Father      Obesity Father        Prior to Admission medications    Medication Sig Start Date End Date Taking? Authorizing Provider   aspirin (ASA) 81 MG EC tablet Take 2 tablets (162 mg) by mouth daily 1/15/21  Yes Miles Smart MD   atorvastatin (LIPITOR) 80 MG tablet Take 1 tablet (80 mg) by mouth every evening 8/12/19 2/10/21 Yes Baljeet Mauro MD   carvedilol (COREG) 3.125 MG tablet Take 1 tablet (3.125 mg) by mouth 2 times daily (with meals) 8/12/19 2/10/21 Yes Baljeet Mauro MD   lisinopril (PRINIVIL/ZESTRIL) 2.5 MG tablet Take 1 tablet (2.5 mg) by mouth daily 8/12/19 2/10/21 Yes Baljeet Mauro MD   nitroGLYcerin (NITROSTAT) 0.4 MG sublingual tablet For chest pain place 1 tablet under the tongue every 5 minutes for 3 doses. If symptoms persist 5 minutes after 2nd dose call 911. 8/13/19   Mariel Amor PA-C   tadalafil (CIALIS) 10 MG tablet Take 1 tablet (10 mg) by mouth daily as needed 1/15/21   Miles Smart MD       No Known Allergies     REVIEW OF SYSTEMS:   5 point ROS negative except as noted above in HPI, including Gen., Resp., CV, GI &  system review.    PHYSICAL EXAM:   There were no vitals taken for this visit. Estimated body mass index is 27.34 kg/m  as calculated from the following:    Height as of 1/15/21: 1.803 m (5' 11\").    Weight as of 1/15/21: 88.9 kg (196 lb).   GENERAL APPEARANCE: alert, and oriented  MENTAL STATUS: alert  AIRWAY EXAM: Mallampatti Class I (visualization of the soft palate, fauces, uvula, anterior and posterior pillars)  RESP: lungs clear to auscultation - no rales, rhonchi or wheezes  CV: regular rates and rhythm  DIAGNOSTICS:    Not indicated    IMPRESSION   ASA Class 1 - Healthy patient, no medical problems    PLAN:   Plan for Colonoscopy with possible biopsy, possible polypectomy. We discussed the risks, benefits and alternatives and the patient wished to proceed.    The above has been forwarded to " the consulting provider.      Signed Electronically by: Jose Rodriguez MD  February 16, 2021

## 2021-02-17 LAB — COPATH REPORT: NORMAL

## 2022-05-03 ENCOUNTER — OFFICE VISIT (OUTPATIENT)
Dept: INTERNAL MEDICINE | Facility: CLINIC | Age: 57
End: 2022-05-03
Payer: COMMERCIAL

## 2022-05-03 VITALS
HEIGHT: 70 IN | SYSTOLIC BLOOD PRESSURE: 124 MMHG | DIASTOLIC BLOOD PRESSURE: 86 MMHG | HEART RATE: 85 BPM | WEIGHT: 207 LBS | OXYGEN SATURATION: 98 % | TEMPERATURE: 98 F | BODY MASS INDEX: 29.63 KG/M2

## 2022-05-03 DIAGNOSIS — H69.93 ETD (EUSTACHIAN TUBE DYSFUNCTION), BILATERAL: Primary | ICD-10-CM

## 2022-05-03 PROCEDURE — 99213 OFFICE O/P EST LOW 20 MIN: CPT | Performed by: INTERNAL MEDICINE

## 2022-05-03 NOTE — PATIENT INSTRUCTIONS
EUSTACHIAN TUBE DYSFUNCTION:     *  Build up of fluid in the middle ear space.       *  No evidence for bacterial infection, No antibiotics indicated.       *  Mucinex extended release (Guaifenisin) 600-1200 mg twice per day on a regular basis for the next few days, then twice per day as needed.  This helps make the secretions drain easier.      *  This is relieved with decongestants (pseudoephedrine), either alone or in combination with other cold medications (such as Claritin D, Dayquil, Tylenol Cold and Sinus, etc.).  Consdier a time release decongestant like Claritin D 12 hour in the morning.  Avoid decongestants within 4 hours of bedtime as they may interfere with sleep.       For those who have been told not to take decongestants due to hypertension, coronary artery disease,etc, Try using Chlorpheniramine (chlotrimeton or Coridin) which is safe to take.       *  If there is any suggestion of allergies (sneezing, watery eyes, scratchy throat), then Claritin (loratidine), Allergra (fexofenidine), or Zyrtec (cetirizine) may be helpful.       *  Affrin nasal spray as needed for severe nasal congestion (especially before the airplane trip), but limit the use to no more than 5-7 days out of fear of producing chronic nasal drainage.       *  Take Ibuprofen (Motrin, Advil) as needed for the pain.     *  If any changes such as fevers, worsening pain, or drainage, then call us and you may need to be re-evaluated.     *  If you fail to improve with conservative measures or if you have severe symptoms, then we may need to send you to the ENT specialists to evaluate your middle ear.     [facility]   [address] [address] [address]   Phone: [phone]

## 2022-05-03 NOTE — PROGRESS NOTES
"  Assessment & Plan     (H69.83) ETD (Eustachian tube dysfunction), bilateral  (primary encounter diagnosis)  Comment: Discussed the pathophysiology of eustachian tube dysfunciton with the pt including a basic description of the anatomy.  Symptomatic therapy suggested: push fluids, use decongestant nasal spray up to 3 days, decongestant of choice or antihistamine-decongestant of choice as needed, saline nasal spray as needed, Robitussin DM prn.  RTC prn if symptoms persist or worsen. Call or return to clinic prn if these symptoms worsen or fail to improve as anticipated.   Based on previous history of a similar event a few years ago, suspect he will ultimately need to see ENT and probably will require a myringotomy, refill ordered, told the patient make an appointment 1 week from now with the ENT clinic in anticipation of nonresolution.  Plan: Otolaryngology Referral                        BMI:   Estimated body mass index is 30.13 kg/m  as calculated from the following:    Height as of this encounter: 1.765 m (5' 9.5\").    Weight as of this encounter: 93.9 kg (207 lb).           Return if symptoms worsen or fail to improve.    Cristian Carson MD  [facility]    Joel Johnson is a 56 year old who presents for the following health issues     History of Present Illness       Reason for visit:  Ear infection  Symptom onset:  1-2 weeks ago  Symptoms include:  Hard to hear  Symptom intensity:  Moderate  Symptom progression:  Staying the same  Had these symptoms before:  No  What makes it worse:  No  What makes it better:  No    He eats 0-1 servings of fruits and vegetables daily.He consumes 0 sweetened beverage(s) daily.He exercises with enough effort to increase his heart rate 10 to 19 minutes per day.  He exercises with enough effort to increase his heart rate 3 or less days per week.   He is taking medications regularly.     recent sensation of fullness in both ears, " "startgin 3-4 weeks ago when he had COVID infection..  No fevers, no chils,  No dyspahgia, no sore throat, no cough.    Has some popping and crackling in the ear  No balance issues.  Has some mildly impaired hearing described as slightly muffled.     He has had numerous ear infections in the past before and this does not feel like the actual infection but rather the buildup of fluid after the infection.      In review of the chart, the patient had identical presentation approximately 3 years ago.  Saw ear nose and throat provider at [facility], [place] who performed a myringotomy for treatment of the symptoms.    **I reviewed the information recorded in the patient's EPIC chart (including but not limited to medical history, surgical history, family history, problem list, medication list, and allergy list) and updated the information as indicated based on the patients reported information.               Review of Systems   Constitutional, HEENT, cardiovascular, pulmonary, gi and gu systems are negative, except as otherwise noted.      Objective    /86   Pulse 85   Temp 98  F (36.7  C) (Tympanic)   Ht 1.765 m (5' 9.5\")   Wt 93.9 kg (207 lb)   SpO2 98%   BMI 30.13 kg/m    Body mass index is 30.13 kg/m .  Physical Exam   GENERAL alert and no distress  EYES:  Normal sclera,conjunctiva, EOMI  HENT: facies symmetric  EARS: Right ear canal minor amount of wax but no significant occlusion, tympanic membrane normal without erythema or purulence.  Left ear canal no wax, tympanic membrane has scarring from previous myringotomy and previous ear infection.  There is a small amount of clear fluid behind the TM, but no erythema no purulence.  MS: extremities- no gross deformities of the visible extremities noted,   PSYCH: Alert and oriented times 3; speech- coherent  SKIN:  No obvious significant skin lesions on visible portions of face   NEURO:  Normal facial movements.  Appears to move normally                 "

## 2022-05-07 ENCOUNTER — HOSPITAL ENCOUNTER (EMERGENCY)
Facility: CLINIC | Age: 57
Discharge: HOME | End: 2022-05-07
Attending: PHYSICIAN ASSISTANT | Admitting: PHYSICIAN ASSISTANT
Payer: OTHER MISCELLANEOUS

## 2022-05-07 VITALS
OXYGEN SATURATION: 97 % | RESPIRATION RATE: 18 BRPM | SYSTOLIC BLOOD PRESSURE: 147 MMHG | DIASTOLIC BLOOD PRESSURE: 80 MMHG | TEMPERATURE: 97.1 F | HEART RATE: 88 BPM

## 2022-05-07 DIAGNOSIS — M54.42 ACUTE BILATERAL LOW BACK PAIN WITH LEFT-SIDED SCIATICA: ICD-10-CM

## 2022-05-07 PROCEDURE — 99283 EMERGENCY DEPT VISIT LOW MDM: CPT

## 2022-05-07 PROCEDURE — 250N000013 HC RX MED GY IP 250 OP 250 PS 637: Performed by: PHYSICIAN ASSISTANT

## 2022-05-07 RX ORDER — PREDNISONE 20 MG/1
40 TABLET ORAL DAILY
Qty: 10 TABLET | Refills: 0 | Status: SHIPPED | OUTPATIENT
Start: 2022-05-07 | End: 2022-05-12

## 2022-05-07 RX ORDER — CYCLOBENZAPRINE HCL 10 MG
10 TABLET ORAL 3 TIMES DAILY PRN
Qty: 10 TABLET | Refills: 0 | Status: SHIPPED | OUTPATIENT
Start: 2022-05-07

## 2022-05-07 RX ORDER — LIDOCAINE 4 G/G
2 PATCH TOPICAL ONCE
Status: DISCONTINUED | OUTPATIENT
Start: 2022-05-07 | End: 2022-05-07 | Stop reason: HOSPADM

## 2022-05-07 RX ORDER — CYCLOBENZAPRINE HCL 10 MG
10 TABLET ORAL ONCE
Status: COMPLETED | OUTPATIENT
Start: 2022-05-07 | End: 2022-05-07

## 2022-05-07 RX ADMIN — CYCLOBENZAPRINE HYDROCHLORIDE 10 MG: 10 TABLET, FILM COATED ORAL at 19:23

## 2022-05-07 RX ADMIN — LIDOCAINE 2 PATCH: 246 PATCH TOPICAL at 19:24

## 2022-05-07 ASSESSMENT — ENCOUNTER SYMPTOMS
NUMBNESS: 0
BACK PAIN: 1

## 2022-05-07 NOTE — LETTER
May 7, 2022      To Whom It May Concern:      Alex FELIZ Dia was seen in our Emergency Department today, 05/07/22.  He will need to be off the next 3 days at minimum.    Sincerely,        José Meza PA-C

## 2022-05-08 NOTE — DISCHARGE INSTRUCTIONS
Use Tylenol 1000mg every 8 hours for pain.  Use Flexeril to aid with muscle relaxation. This may be sedating.  Use prednisone as prescribed to see if this helps decrease pain going down your leg.    Discharge Instructions  Back Pain  You were seen today for back pain. Back pain can have many causes, but most will get better without surgery or other specific treatment. Sometimes there is a herniated ( slipped ) disc. We do not usually do MRI scans to look for these right away, since most herniated discs will get better on their own with time.  Today, we did not find any evidence that your back pain was caused by a serious condition. However, sometimes symptoms develop over time and cannot be found during an emergency visit, so it is very important that you follow up with your primary provider.  Generally, every Emergency Department visit should have a follow-up clinic visit with either a primary or a specialty clinic/provider. Please follow-up as instructed by your emergency provider today.    Return to the Emergency Department if:  You develop a fever with your back pain.   You have weakness or change in sensation in one or both legs.  You lose control of your bowels or bladder, or cannot empty your bladder (cannot pee).  Your pain gets much worse.     Follow-up with your provider:  Unless your pain has completely gone away, please make an appointment with your provider within one week. Most of the routine care for back pain is available in a clinic and not the Emergency Department. You may need further management of your back pain, such as more pain medication, imaging such as an X-ray or MRI, or physical therapy.    What can I do to help myself?  Remain Active -- People are often afraid that they will hurt their back further or delay recovery by remaining active, but this is one of the best things you can do for your back. In fact, staying in bed for a long time to rest is not recommended. Studies have shown that  people with low back pain recover faster when they remain active. Movement helps to bring blood flow to the muscles and relieve muscle spasms as well as preventing loss of muscle strength.  Heat -- Using a heating pad can help with low back pain during the first few weeks. Do not sleep with a heating pad, as you can be burned.   Pain medications - You may take a pain medication such as Tylenol  (acetaminophen), Advil , Motrin  (ibuprofen) or Aleve  (naproxen).  If you were given a prescription for medicine here today, be sure to read all of the information (including the package insert) that comes with your prescription.  This will include important information about the medicine, its side effects, and any warnings that you need to know about.  The pharmacist who fills the prescription can provide more information and answer questions you may have about the medicine.  If you have questions or concerns that the pharmacist cannot address, please call or return to the Emergency Department.   Remember that you can always come back to the Emergency Department if you are not able to see your regular provider in the amount of time listed above, if you get any new symptoms, or if there is anything that worries you.

## 2022-05-08 NOTE — ED TRIAGE NOTES
Pt presents with left lower back pain that began yesterday when picking up a box of 4 pineapples and turning to the left at [employer]. Pt reports history of back pain. Ambulatory in triage. Denies numbness or tingling. Left foot throbbing     Triage Assessment     Row Name 05/07/22 2984       Triage Assessment (Adult)    Airway WDL WDL       Cognitive/Neuro/Behavioral WDL    Cognitive/Neuro/Behavioral WDL WDL

## 2022-05-08 NOTE — ED PROVIDER NOTES
"  History   Chief Complaint:  Back Pain     HPI   Alex Alvares is a 56 year old male with history of NSTEMI who presents with left-sided low back pain that started yesterday when he was lifting some pineapples at [employer] yesterday at 1400. States he lifted a box of 4 pineapples and when he turned to place it in his cart, he felt an \"electric\" pain in his low back and fell to his knees. He was able to get up using the cart and walk out to his car. He laid in bed and tried to rest, hoping the pain would resolve. Pain shoots down his left leg to his toes. Denies saddle anesthesia, bowel/ bladder incontinence, and numbness/ tingling. No midline back pain. No IV drug use. He has been taking Tylenol, Aspirin, and Gabapentin (from his mother-in-law) for pain control.     Review of Systems   Musculoskeletal: Positive for back pain.   Neurological: Negative for numbness.   All other systems reviewed and are negative.    Allergies:  The patient has no known allergies.     Medications:  Aspirin  Nitrostat  Lipitor  Coreg  Zestril  Ultram     Past Medical History:     Bilateral high frequency sensorineural hearing loss  NSTEMI  DDD  Cervical radiculopathy  CAD  Dyslipidemia  MRSA infection    Past Surgical History:    CV coronary angiogram  CV instantaneous wave-free ratio  CV PCI stent drug eluting  Mouth surgery   Throat surgery procedure unlisted  Hand surgery, left      Family History:    Mother: Alzheimer disease, cancer  Father: heart failure, heart defect, heart disease, diabetes, obesity     Social History:  The patient presents to the ED with his wife.   PCP: Stephanie, [facility].     Physical Exam     Patient Vitals for the past 24 hrs:   BP Temp Temp src Pulse Resp SpO2   05/07/22 1907 (!) 156/98 97.1  F (36.2  C) Temporal 110 18 97 %       Physical Exam  Constitutional: Pleasant. Cooperative.   Eyes: Pupils equally round and reactive  HENT: Head is normal in appearance. Oropharynx is normal with moist mucus " membranes.  Cardiovascular: Regular rate and rhythm and without murmurs.  Respiratory: Normal respiratory effort, lungs are clear bilaterally.  Musculoskeletal: No midline spinal tenderness. TTP to L lower lumbar paraspinal musculature. 5/5 strength with hip flexion, knee flexion, knee extension, dorsiflexion, and plantarflexion bilaterally.   Skin: Normal, without rash.  Neurologic: Cranial nerves grossly intact, normal cognition, no focal deficits. Alert and oriented x 3.  Sensation to light touch intact in bilateral lower extremities. Able to ambulate.  Psychiatric: Normal affect.  Nursing notes and vital signs reviewed.    Emergency Department Course     Emergency Department Course:    Reviewed:  I reviewed nursing notes, vitals, past medical history and Care Everywhere    Assessments:  1909 I obtained history and examined the patient as noted above.   1958 I rechecked the patient and explained findings.     Interventions:  1923 Flexeril 10mg oral   1924 Lidocare 2 patch transdermal     Disposition:  The patient was discharged to home.     Impression & Plan     Medical Decision Making:  Alex Alvares presented with back pain and radicular symptoms. The pain has improved with interventions in the emergency department. He did not sustain any trauma, therefore x-rays are not necessary due to the low likelihood of fracture or subluxation. Advanced imaging was not indicated at this time, but may be indicated in the future if symptoms fail to resolve.  There are no red flags for cauda equina syndrome, discitis, spinal/epidural space hematoma or abscess. He was advised that radiculopathy often takes time to resolve, and that follow up with primary care, neurology and/or neurosurgery will be indicated if symptoms do not improve. He will be discharged with RXs for Methocarbamol and prednisone to use as directed. Advised use to Tylenol as well. The patient was instructed to avoid heavy lifting, bending or twisting.  Immediate return to ER for increasing pain, muscular weakness, or bowel or bladder dysfunction. All questions answered. Patient discharged to home in stable condition.    Diagnosis:    ICD-10-CM    1. Acute bilateral low back pain with left-sided sciatica  M54.42        Discharge Medications:  New Prescriptions    CYCLOBENZAPRINE (FLEXERIL) 10 MG TABLET    Take 1 tablet (10 mg) by mouth 3 times daily as needed for muscle spasms    PREDNISONE (DELTASONE) 20 MG TABLET    Take 2 tablets (40 mg) by mouth daily for 5 days       Scribe Disclosure:  I, Laxmi Templeton, am serving as a scribe at 7:10 PM on 5/7/2022 to document services personally performed by José Meza PA-C based on my observations and the provider's statements to me.     This record was created at least in part using electronic voice recognition software, so please excuse any typographical errors.       José Meza PA-C  05/07/22 2018

## (undated) DEVICE — ENDO SNARE EXACTO COLD 9MM LOOP 2.4MMX230CM 00711115

## (undated) DEVICE — INTRO GLIDESHEATH SLENDER 6FR 10X45CM 60-1060

## (undated) DEVICE — KIT HAND CONTROL ANGIOTOUCH ACIST 65CM AT-P65

## (undated) DEVICE — CATH 6FR X 100CM, OPTITORQUE C

## (undated) DEVICE — WIRE GUIDE 0.035"X260CM SAFE-T-J EXCHANGE G00517

## (undated) DEVICE — KIT LG BORE TOUHY ACCESS PLUS MAP152

## (undated) DEVICE — Device

## (undated) DEVICE — KIT ENDO TURNOVER/PROCEDURE W/CLEAN A SCOPE LINERS 103888

## (undated) DEVICE — ENDO TRAP POLYP QUICK CATCH 710201

## (undated) DEVICE — SLEEVE TR BAND RADIAL COMPRESSION DEVICE 24CM TRB24-REG

## (undated) DEVICE — CATH LAUNCHER 6FR JR 4.0 LA6JR40

## (undated) DEVICE — GUIDEWIRE VERRATA PLUS PRESSURE 185CM JTIP 10185JP

## (undated) DEVICE — RAD INFLATOR BASIC COMPAK  IN4130

## (undated) DEVICE — DEFIB PRO-PADZ LVP LQD GEL ADULT 8900-2105-01

## (undated) DEVICE — CATH LAUNCHER 6FR EBU 30 LA6EBU30

## (undated) DEVICE — MANIFOLD KIT ANGIO AUTOMATED 014613

## (undated) DEVICE — GUIDEWIRE VASC 0.014INX180CM RUNTHROUGH 25-1011

## (undated) RX ORDER — FENTANYL CITRATE 50 UG/ML
INJECTION, SOLUTION INTRAMUSCULAR; INTRAVENOUS
Status: DISPENSED
Start: 2021-02-16